# Patient Record
Sex: FEMALE | Race: WHITE | NOT HISPANIC OR LATINO | Employment: FULL TIME | URBAN - METROPOLITAN AREA
[De-identification: names, ages, dates, MRNs, and addresses within clinical notes are randomized per-mention and may not be internally consistent; named-entity substitution may affect disease eponyms.]

---

## 2017-01-16 ENCOUNTER — CONVERSION ENCOUNTER (OUTPATIENT)
Dept: MAMMOGRAPHY | Facility: HOSPITAL | Age: 57
End: 2017-01-16

## 2017-02-02 ENCOUNTER — CONVERSION ENCOUNTER (OUTPATIENT)
Dept: MAMMOGRAPHY | Facility: HOSPITAL | Age: 57
End: 2017-02-02

## 2017-05-12 ENCOUNTER — CONVERSION ENCOUNTER (OUTPATIENT)
Dept: GENERAL RADIOLOGY | Facility: HOSPITAL | Age: 57
End: 2017-05-12

## 2018-03-08 ENCOUNTER — OFFICE VISIT CONVERTED (OUTPATIENT)
Dept: CARDIOLOGY | Facility: CLINIC | Age: 58
End: 2018-03-08
Attending: INTERNAL MEDICINE

## 2018-05-03 ENCOUNTER — CONVERSION ENCOUNTER (OUTPATIENT)
Dept: GENERAL RADIOLOGY | Facility: HOSPITAL | Age: 58
End: 2018-05-03

## 2018-08-09 ENCOUNTER — OFFICE VISIT CONVERTED (OUTPATIENT)
Dept: OTHER | Facility: HOSPITAL | Age: 58
End: 2018-08-09
Attending: NURSE PRACTITIONER

## 2018-08-15 ENCOUNTER — OFFICE VISIT CONVERTED (OUTPATIENT)
Dept: CARDIOLOGY | Facility: CLINIC | Age: 58
End: 2018-08-15
Attending: INTERNAL MEDICINE

## 2018-08-21 ENCOUNTER — OFFICE VISIT CONVERTED (OUTPATIENT)
Dept: GASTROENTEROLOGY | Facility: CLINIC | Age: 58
End: 2018-08-21
Attending: PHYSICIAN ASSISTANT

## 2018-10-19 ENCOUNTER — OFFICE VISIT CONVERTED (OUTPATIENT)
Dept: GASTROENTEROLOGY | Facility: CLINIC | Age: 58
End: 2018-10-19
Attending: PHYSICIAN ASSISTANT

## 2018-11-09 ENCOUNTER — OFFICE VISIT CONVERTED (OUTPATIENT)
Dept: CARDIOLOGY | Facility: CLINIC | Age: 58
End: 2018-11-09
Attending: INTERNAL MEDICINE

## 2020-03-03 ENCOUNTER — HOSPITAL ENCOUNTER (OUTPATIENT)
Dept: SURGERY | Facility: CLINIC | Age: 60
Discharge: HOME | End: 2020-03-03
Attending: PHYSICIAN ASSISTANT

## 2020-03-03 ENCOUNTER — CONVERSION ENCOUNTER (OUTPATIENT)
Dept: SURGERY | Facility: CLINIC | Age: 60
End: 2020-03-03

## 2020-03-03 ENCOUNTER — OFFICE VISIT CONVERTED (OUTPATIENT)
Dept: SURGERY | Facility: CLINIC | Age: 60
End: 2020-03-03
Attending: PHYSICIAN ASSISTANT

## 2020-03-05 LAB
AMOXICILLIN+CLAV SUSC ISLT: 16
AMPICILLIN SUSC ISLT: >=32
AMPICILLIN+SULBAC SUSC ISLT: 16
BACTERIA UR CULT: ABNORMAL
CEFAZOLIN SUSC ISLT: <=4
CEFEPIME SUSC ISLT: <=1
CEFTAZIDIME SUSC ISLT: <=1
CEFTRIAXONE SUSC ISLT: <=1
CEFUROXIME ORAL SUSC ISLT: 16
CEFUROXIME PARENTER SUSC ISLT: 16
CIPROFLOXACIN SUSC ISLT: >=4
ERTAPENEM SUSC ISLT: <=0.5
GENTAMICIN SUSC ISLT: >=16
LEVOFLOXACIN SUSC ISLT: >=8
NITROFURANTOIN SUSC ISLT: <=16
TETRACYCLINE SUSC ISLT: >=16
TMP SMX SUSC ISLT: >=320
TOBRAMYCIN SUSC ISLT: 2

## 2020-04-01 ENCOUNTER — TELEMEDICINE CONVERTED (OUTPATIENT)
Dept: UROLOGY | Facility: CLINIC | Age: 60
End: 2020-04-01
Attending: UROLOGY

## 2020-07-15 ENCOUNTER — HOSPITAL ENCOUNTER (OUTPATIENT)
Dept: GENERAL RADIOLOGY | Facility: HOSPITAL | Age: 60
Discharge: HOME | End: 2020-07-15
Attending: UROLOGY

## 2020-07-15 LAB
CREAT BLD-MCNC: 0.8 MG/DL (ref 0.6–1.4)
GFR SERPLBLD BASED ON 1.73 SQ M-ARVRAT: >60 ML/MIN/{1.73_M2}

## 2020-07-22 ENCOUNTER — OFFICE VISIT CONVERTED (OUTPATIENT)
Dept: UROLOGY | Facility: CLINIC | Age: 60
End: 2020-07-22
Attending: UROLOGY

## 2020-07-22 ENCOUNTER — HOSPITAL ENCOUNTER (OUTPATIENT)
Dept: SURGERY | Facility: CLINIC | Age: 60
Discharge: HOME | End: 2020-07-22
Attending: UROLOGY

## 2020-07-24 LAB
AMOXICILLIN+CLAV SUSC ISLT: <=2
AMPICILLIN SUSC ISLT: <=2
AMPICILLIN+SULBAC SUSC ISLT: <=2
BACTERIA UR CULT: ABNORMAL
CEFAZOLIN SUSC ISLT: <=4
CEFEPIME SUSC ISLT: <=1
CEFTAZIDIME SUSC ISLT: <=1
CEFTRIAXONE SUSC ISLT: <=1
CEFUROXIME ORAL SUSC ISLT: <=1
CEFUROXIME PARENTER SUSC ISLT: <=1
CIPROFLOXACIN SUSC ISLT: <=0.25
ERTAPENEM SUSC ISLT: <=0.5
GENTAMICIN SUSC ISLT: <=1
LEVOFLOXACIN SUSC ISLT: <=0.12
NITROFURANTOIN SUSC ISLT: <=16
TETRACYCLINE SUSC ISLT: <=1
TMP SMX SUSC ISLT: <=20
TOBRAMYCIN SUSC ISLT: <=1

## 2020-11-25 ENCOUNTER — OFFICE VISIT CONVERTED (OUTPATIENT)
Dept: UROLOGY | Facility: CLINIC | Age: 60
End: 2020-11-25
Attending: UROLOGY

## 2020-11-25 ENCOUNTER — CONVERSION ENCOUNTER (OUTPATIENT)
Dept: SURGERY | Facility: CLINIC | Age: 60
End: 2020-11-25

## 2020-11-25 ENCOUNTER — HOSPITAL ENCOUNTER (OUTPATIENT)
Dept: SURGERY | Facility: CLINIC | Age: 60
Discharge: HOME | End: 2020-11-25
Attending: UROLOGY

## 2020-11-27 LAB — BACTERIA UR CULT: NORMAL

## 2020-12-23 ENCOUNTER — HOSPITAL ENCOUNTER (OUTPATIENT)
Dept: SURGERY | Facility: CLINIC | Age: 60
Discharge: HOME | End: 2020-12-23
Attending: UROLOGY

## 2020-12-25 LAB — BACTERIA UR CULT: NORMAL

## 2020-12-30 ENCOUNTER — PROCEDURE VISIT CONVERTED (OUTPATIENT)
Dept: UROLOGY | Facility: CLINIC | Age: 60
End: 2020-12-30
Attending: UROLOGY

## 2021-05-10 NOTE — PROCEDURES
Procedure Note      Patient Name: Yani Rowe   Patient ID: [id number]   Sex: Female   YOB: 1960    Primary Care Provider: Deejay Kaufman DO   Referring Provider: ADA JOHNSON MD    Visit Date: December 30, 2020    Provider: Annette Mackenzie MD   Location: [facility]   Location Address: [address] [place] [address]  [id number]   Location Phone: [phone]          Cystoscopy Procedure:  PROCEDURE: Flexible cystoscope was passed per urethra into the bladder without difficulty after proper consent. The bladder was inspected in a systematic meridian fashion. There were no tumors, lesions, stones, or other abnormalities noted within the bladder. Of note, there was no increased vascularity as well. Both ureteral orifices were identified and were normal in appearance. The flexible cystoscope was removed. The patient tolerated the procedure well.           Assessment  · Cystitis     595.9/N30.90  · Hematuria     599.70/R31.9      Plan  · Orders  o Cystoscopy (19501) - 595.9/N30.90, 599.70/R31.9 - 12/30/2020  · Instructions  o TIME OUT PROCEDURE: Correct patient and birth date; Correct procedure; Correct Physician; Consent signed  o She needs a referral to urogynecology or [facility] in [place] for possible repair of her rectocele which is likely the cause of her recurrent infections. She also has severe issues with bowel movements.             Electronically Signed by: Annette Mackenzie MD -Author on December 30, 2020 05:34:51 PM

## 2021-05-12 NOTE — PROGRESS NOTES
Quick Note      Patient Name: Yani Rowe   Patient ID: [id number]   Sex: Female   YOB: 1960    Primary Care Provider: Deejay Kaufman DO   Referring Provider: Deejay Kaufman DO    Visit Date: April 1, 2020    Provider: Annette Mackenzie MD   Location: [facility]   Location Address: [address] [place] [address]  [id number]   Location Phone: [phone]          History Of Present Illness  The patient is a 59 year old /White female, who is a consultation from Deejay Kaufman DO, for a persistent UTI.   Symptoms are similar to previous UTIs and burning when voiding; frequency and urgency; no symptoms now but here to establish care. This patient describes many UTIs over a 10 years period. She received treatment for these infections. The treating physician was Deejay Kaufman DO. She was treated with various antibiotics. Urine cultures revealed E. coli. She is no longer bothered by the symptoms. There are no additional symptoms. There are no aggravating factors. The patient is not constipated.   She has had prior treatment. Good relief has been obtained with antibiotics. The most recent antibiotic was taken 3 weeks ago. Prior evaluation has not been done.   She has no significant prior  problems.   TELEHEALTH VISIT  Chief Complaint: Persistent UTI   Yani Rowe is a 59 year old /White female who is presenting for evaluation via telehealth visit. Verbal consent obtained before beginning visit.   Provider spent 10 minutes with the patient during telehealth visit.   The following staff were present during this visit: Beverly Aguilar and Annette Mackenzie MD   Past Medical History/Overview of Patient Symptoms     She is not sure her infection is any better at this point.  In reviewing her recent records it appears she had a CT scan done about 7 or 8 years ago which did not show any stones but she is not had any imaging since then.  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      PMH  ***********************************************  Patient denies history of kidney stones    Patient hygiene  showers:  daily   wipe:  front to back - on occasion pats instead of wipes  all different types of underwear - tries to stick to cotton underwear  BM's:3 times  urinary incontinence:  yes  with urge or when she thinks she is done and gets up and dribbles on her self  most of time in the morning when she gets out of bed she will leak  OR stands up from sitting she will leak    Nocturia:  1-2  Daytime frequency:  4 times a day    Patient complains of yeast infection.   No antibiotics times 1 month.  Does have DM.  BS around 120-130      WE do not have records today of cultures done by PCP           Assessment  · Cystitis     595.9/N30.90  · Urinary tract infection     599.0/N39.0  · Candidiasis of genitalia in female     112.1/B37.3  · Hematuria     599.70/R31.9      Plan  · Orders  o Physican Telephone evaluation, 5-10 min (04318) - 599.0/N39.0, 112.1/B37.3, 595.9/N30.90 - 04/01/2020  o CT Abdomen and Pelvis without and with Contrast UROLOGY PROTOCOL (includes delayed imaging) [facility] ([id number]) - 595.9/N30.90, 599.70/R31.9 - 07/15/2020  · Medications  o Medications have been Reconciled  o Transition of Care or Provider Policy  · Instructions  o The patient has persistent UTI symptoms which are not responsive to the current treatment. She will have a CT scan done to try to evaluate for causes of her persistent UTI. We will schedule that in July when she is able to have it done after hopefully the Covid crisis has passed. I will see her for follow-up after that scan.  o Plan Of Care:   o Chronic conditions reviewed and taken into consideration for today's treatment plan.  o Patient instructed to seek medical attention urgently for new or worsening symptoms.  o Patient was educated/instructed on their  diagnosis, treatment and medications prior to discharge from the clinic today.            Electronically Signed by: Annette Mackenzie MD -Author on April 1, 2020 12:03:10 PM

## 2021-05-13 NOTE — PROGRESS NOTES
Progress Note      Patient Name: Yani Rowe   Patient ID: [id number]   Sex: Female   YOB: 1960    Primary Care Provider: Deejay Kaufman DO   Referring Provider: ADA JOHNSON MD    Visit Date: November 25, 2020    Provider: Annette Mackenzie MD   Location: [facility]   Location Address: [address] [place] [address]  [id number]   Location Phone: [phone]          Chief Complaint  · pt is here for urological concerns      History Of Present Illness  The patient is a 59 year old /White female , who is a follow up from Deejay Kaufman DO , for a persistent UTI.   Symptoms are similar to previous UTIs and burning when voiding; frequency and urgency; no symptoms now but here to establish care. This patient describes many UTIs over a 10 years period. She received treatment for these infections. The treating physician was Deejay Kaufman DO. She was treated with various antibiotics. Urine cultures revealed E. coli. She is no longer bothered by the symptoms. There are no additional symptoms. There are no aggravating factors. The patient is constipated.   She has had prior treatment. Good relief has been obtained with antibiotics. The most recent antibiotic was taken 3 weeks ago. Prior evaluation has not been done.   She has no significant prior  problems.      She is not sure her infection is any better at this point.  In reviewing her recent records it appears she had a CT scan done about 7 or 8 years ago which did not show any stones but she is not had any imaging since then.  She is appears her most of her infections are multidrug-resistant E. coli and she is had several a year for the past several years.    She recently had a repeat CT scan in July 2020 and it again is negative other than a rectocele.  She has no stones.  She states today that she has had issues with chronic constipation for years and sometimes has to splint or move around in order to have a  bowel movement.  She also at times has diarrhea.     Her urine appears infected today.  She states she has had one bad infection since I saw her last.          PMH  ***********************************************  Patient denies history of kidney stones    Patient hygiene  showers:  daily   wipe:  front to back - on occasion pats instead of wipes  all different types of underwear - tries to stick to cotton underwear  BM's:3 times  urinary incontinence:  yes  with urge or when she thinks she is done and gets up and dribbles on her self  most of time in the morning when she gets out of bed she will leak  OR stands up from sitting she will leak    Nocturia:  1-2  Daytime frequency:  4 times a day    Patient complains of yeast infection.   No antibiotics times 1 month.  Does have DM.  BS around 120-130       Past Medical History  Cystitis; Diverticulosis; Headache; Heart Disease; Hernia; High cholesterol; HTN (hypertension); SRINI (obstructive sleep apnea); Spells         Past Surgical History  Bladder Surg.; Cholecstectomy; Colonoscopy; EGD; Tubal ligation; Ventriculomyotomy for idiopathic hypertrophic subaortic stenosis         Medication List  Allegra Allergy 180 mg oral tablet; amlodipine 5 mg oral tablet; Enteric Coated Aspirin 81 mg oral tablet,delayed release (DR/EC); fluticasone propionate 50 mcg/actuation nasal spray,suspension; magnesium oral; metformin 500 mg oral tablet; metoprolol succinate 100 mg oral tablet extended release 24 hr; Multivitamin 50 Plus oral tablet; Vitamin B-12 1,000 mcg oral tablet         Allergy List  naproxen         Family Medical History  Liver Neoplasm, Malignant; Stomach Neoplasm, Malignant; Cervical Neoplasm, Malignant; Lung cancer         Social History  Alcohol (Current some day); ; Recreational Drug Use (Never); Tobacco (Never)         Review of Systems  · Constitutional  o Denies  o : chills, fever  · Gastrointestinal  o Denies  o : nausea, vomiting      Vitals  Date Time  "BP Position Site L\R Cuff Size HR RR TEMP (F) WT  HT  BMI kg/m2 BSA m2 O2 Sat FR L/min FiO2 HC       11/25/2020 04:07 /72 Sitting       269lbs 0oz 5'  4\" 46.17 2.35             Physical Examination  · Constitutional  o Appearance  o : well-nourished, well developed, alert, in no acute distress  · Respiratory  o Respiratory Effort  o : breathing unlabored  · Gastrointestinal  o Abdominal Examination  o : abdomen nontender to palpation, tone normal without rigidity or guarding, no masses present, abdominal obesity present           Results  · In-Office Procedures  o Lab procedure  § Automated dipstick urinalysis with microscopy ([id number])   § Color Ur: Yellow   § Clarity Ur: Clear   § Glucose Ur Ql Strip: Negative   § Bilirub Ur Ql Strip: Negative   § Ketones Ur Ql Strip: Negative   § Sp Gr Ur Qn: >=1.030   § Hgb Ur Ql Strip: Trace-intact   § pH Ur-LsCnc: 6.0   § Prot Ur Ql Strip: 30   § Urobilinogen Ur Strip-mCnc: 0.2   § Nitrite Ur Ql Strip: Negative   § WBC Est Ur Ql Strip: Small   § RBC UrnS Qn HPF: 5   § WBC UrnS Qn HPF: 20-50   § Bacteria UrnS Qn HPF: 2+   § Crystals UrnS Qn HPF: 0   § Epithelial Cells (non renal): 0 /HPF  § Epithelial Cells (renal): 0       Assessment  · Cystitis     595.9/N30.90  · Urinary tract infection     599.0/N39.0  · Candidiasis of genitalia in female     112.1/B37.3  · Hematuria     599.70/R31.9      Plan  · Orders  o Urine culture ([id number], [id number]) - 599.0/N39.0, 112.1/B37.3 - 11/25/2020  · Medications  o Medications have been Reconciled  o Transition of Care or Provider Policy  · Instructions  o The patient has persistent UTI symptoms which are not responsive to the current treatment. She has a rectocele and chronic competent constipation and diarrhea which likely could explain her persistent infection. She has seen Dr. Cisneros in the past and will see him again. She will arrange that appointment. I am sending her urine for culture today. I will set her up for an office cystosocopy. "   o Electronically Identified Patient Education Materials Provided Electronically            Electronically Signed by: Annette Mackenzie MD -Author on November 25, 2020 04:58:14 PM

## 2021-05-13 NOTE — PROGRESS NOTES
Progress Note      Patient Name: Yani Rowe   Patient ID: [id number]   Sex: Female   YOB: 1960    Primary Care Provider: Deejay Kaufman DO   Referring Provider: Deejay Kaufman DO    Visit Date: July 22, 2020    Provider: Annette Mackenzie MD   Location: [facility]   Location Address: [address] [place] [address]  [id number]   Location Phone: [phone]          Chief Complaint  · hematuria / cystitis      History Of Present Illness  The patient is a 59 year old /White female , who is a follow up from Deejay Kaufman DO , for a persistent UTI.   Symptoms are similar to previous UTIs and burning when voiding; frequency and urgency; no symptoms now but here to establish care. This patient describes many UTIs over a 10 years period. She received treatment for these infections. The treating physician was Deejay Kaufman DO. She was treated with various antibiotics. Urine cultures revealed E. coli. She is no longer bothered by the symptoms. There are no additional symptoms. There are no aggravating factors. The patient is constipated.   She has had prior treatment. Good relief has been obtained with antibiotics. The most recent antibiotic was taken 3 weeks ago. Prior evaluation has not been done.   She has no significant prior  problems.      She is not sure her infection is any better at this point.  In reviewing her recent records it appears she had a CT scan done about 7 or 8 years ago which did not show any stones but she is not had any imaging since then.  She is appears her most of her infections are multidrug-resistant E. coli and she is had several a year for the past several years.    She recently had a repeat CT scan and it again is negative other than a rectocele.  She has no stones.  She states today that she has had issues with chronic constipation for years and sometimes has to splint or move around in order to have a bowel movement.  She also at times has  diarrhea.  Her urine appears infected today.      PMH  ***********************************************  Patient denies history of kidney stones    Patient hygiene  showers:  daily   wipe:  front to back - on occasion pats instead of wipes  all different types of underwear - tries to stick to cotton underwear  BM's:3 times  urinary incontinence:  yes  with urge or when she thinks she is done and gets up and dribbles on her self  most of time in the morning when she gets out of bed she will leak  OR stands up from sitting she will leak    Nocturia:  1-2  Daytime frequency:  4 times a day    Patient complains of yeast infection.   No antibiotics times 1 month.  Does have DM.  BS around 120-130       Past Medical History  Cystitis; Diverticulosis; Headache; Heart Disease; Hernia; High cholesterol; HTN (hypertension); SRINI (obstructive sleep apnea); Spells         Past Surgical History  Bladder Surg.; Cholecstectomy; Colonoscopy; EGD; Tubal ligation; Ventriculomyotomy for idiopathic hypertrophic subaortic stenosis         Medication List  Allegra Allergy 180 mg oral tablet; amlodipine 5 mg oral tablet; Enteric Coated Aspirin 81 mg oral tablet,delayed release (DR/EC); fluticasone propionate 50 mcg/actuation nasal spray,suspension; magnesium oral; metformin 500 mg oral tablet; metoprolol succinate 100 mg oral tablet extended release 24 hr; Multivitamin 50 Plus oral tablet; Vitamin B-12 1,000 mcg oral tablet         Allergy List  naproxen       Allergies Reconciled  Family Medical History  Liver Neoplasm, Malignant; Stomach Neoplasm, Malignant; Cervical Neoplasm, Malignant; Lung cancer         Social History  Alcohol (Current some day); ; Recreational Drug Use (Never); Tobacco (Never)         Review of Systems  · Constitutional  o Denies  o : chills, fever  · Gastrointestinal  o Denies  o : nausea, vomiting      Vitals  Date Time BP Position Site L\R Cuff Size HR RR TEMP (F) WT  HT  BMI kg/m2 BSA m2 O2 Sat HC      "  07/22/2020 02:18 /77 Sitting       261lbs 0oz 5'  4\" 44.8 2.31           Physical Examination  · Constitutional  o Appearance  o : well-nourished, well developed, alert, in no acute distress  · Respiratory  o Respiratory Effort  o : breathing unlabored  · Gastrointestinal  o Abdominal Examination  o : abdomen nontender to palpation, tone normal without rigidity or guarding, no masses present, abdominal obesity present           Results  · In-Office Procedures  o Lab procedure  § Automated dipstick urinalysis with microscopy ([id number])   § Color Ur: Yellow   § Clarity Ur: Clear   § Glucose Ur Ql Strip: Negative   § Bilirub Ur Ql Strip: Negative   § Ketones Ur Ql Strip: Negative   § Sp Gr Ur Qn: 1.025   § Hgb Ur Ql Strip: Negative   § pH Ur-LsCnc: 6.0   § Prot Ur Ql Strip: Trace   § Urobilinogen Ur Strip-mCnc: 0.2   § Nitrite Ur Ql Strip: Negative   § WBC Est Ur Ql Strip: Small   § RBC UrnS Qn HPF: 5-10   § WBC UrnS Qn HPF: 5-10   § Bacteria UrnS Qn HPF: 2+   § Crystals UrnS Qn HPF: 0   § Epithelial Cells (non renal): 0 /HPF  § Epithelial Cells (renal): 0       Assessment  · Cystitis     595.9/N30.90  · Urinary tract infection     599.0/N39.0  · Candidiasis of genitalia in female     112.1/B37.3  · Hematuria     599.70/R31.9      Plan  · Orders  o Urine culture ([id number], [id number]) - 599.0/N39.0, 112.1/B37.3 - 07/22/2020  · Medications  o Medications have been Reconciled  o Transition of Care or Provider Policy  · Instructions  o The patient has persistent UTI symptoms which are not responsive to the current treatment. She has a rectocele and chronic competent constipation and diarrhea which likely could explain her persistent infection. She has seen Dr. Kendrick in the past and so we will get her an appointment to see his office further the constipation issue. I am sending her urine for culture today.   o KUB  o Electronically Identified Patient Education Materials Provided Electronically            Electronically " Signed by: Annette Mackenzie MD -Author on July 22, 2020 03:28:56 PM

## 2021-05-14 VITALS
OXYGEN SATURATION: 98 % | WEIGHT: 264 LBS | BODY MASS INDEX: 45.07 KG/M2 | HEART RATE: 72 BPM | SYSTOLIC BLOOD PRESSURE: 177 MMHG | HEIGHT: 64 IN | DIASTOLIC BLOOD PRESSURE: 108 MMHG

## 2021-05-14 VITALS
BODY MASS INDEX: 45.93 KG/M2 | WEIGHT: 269 LBS | DIASTOLIC BLOOD PRESSURE: 72 MMHG | SYSTOLIC BLOOD PRESSURE: 146 MMHG | HEIGHT: 64 IN

## 2021-05-15 VITALS
BODY MASS INDEX: 44.56 KG/M2 | HEIGHT: 64 IN | DIASTOLIC BLOOD PRESSURE: 77 MMHG | WEIGHT: 261 LBS | SYSTOLIC BLOOD PRESSURE: 152 MMHG

## 2021-05-15 VITALS — RESPIRATION RATE: 14 BRPM | HEIGHT: 64 IN | BODY MASS INDEX: 43.96 KG/M2 | WEIGHT: 257.5 LBS

## 2021-05-16 VITALS
HEIGHT: 64 IN | HEART RATE: 58 BPM | DIASTOLIC BLOOD PRESSURE: 80 MMHG | WEIGHT: 254 LBS | SYSTOLIC BLOOD PRESSURE: 126 MMHG | BODY MASS INDEX: 43.36 KG/M2

## 2021-05-16 VITALS
WEIGHT: 244 LBS | BODY MASS INDEX: 41.66 KG/M2 | HEART RATE: 64 BPM | DIASTOLIC BLOOD PRESSURE: 80 MMHG | SYSTOLIC BLOOD PRESSURE: 122 MMHG | HEIGHT: 64 IN

## 2021-05-16 VITALS
HEIGHT: 64 IN | BODY MASS INDEX: 42.17 KG/M2 | SYSTOLIC BLOOD PRESSURE: 108 MMHG | WEIGHT: 247 LBS | HEART RATE: 66 BPM | DIASTOLIC BLOOD PRESSURE: 72 MMHG

## 2021-05-16 VITALS
SYSTOLIC BLOOD PRESSURE: 130 MMHG | RESPIRATION RATE: 12 BRPM | OXYGEN SATURATION: 99 % | BODY MASS INDEX: 42.58 KG/M2 | HEIGHT: 64 IN | WEIGHT: 249.44 LBS | DIASTOLIC BLOOD PRESSURE: 76 MMHG | HEART RATE: 62 BPM

## 2021-05-16 VITALS
RESPIRATION RATE: 18 BRPM | HEIGHT: 64 IN | HEART RATE: 83 BPM | TEMPERATURE: 97.8 F | DIASTOLIC BLOOD PRESSURE: 60 MMHG | OXYGEN SATURATION: 95 % | SYSTOLIC BLOOD PRESSURE: 110 MMHG | BODY MASS INDEX: 42 KG/M2 | WEIGHT: 246 LBS

## 2021-05-16 VITALS
DIASTOLIC BLOOD PRESSURE: 75 MMHG | HEART RATE: 55 BPM | HEIGHT: 64 IN | BODY MASS INDEX: 43.54 KG/M2 | WEIGHT: 255 LBS | OXYGEN SATURATION: 100 % | SYSTOLIC BLOOD PRESSURE: 139 MMHG

## 2021-08-04 ENCOUNTER — TRANSCRIBE ORDERS (OUTPATIENT)
Dept: ADMINISTRATIVE | Facility: HOSPITAL | Age: 61
End: 2021-08-04

## 2021-08-04 DIAGNOSIS — Z12.39 SCREENING BREAST EXAMINATION: Primary | ICD-10-CM

## 2021-08-06 ENCOUNTER — TRANSCRIBE ORDERS (OUTPATIENT)
Dept: ADMINISTRATIVE | Facility: HOSPITAL | Age: 61
End: 2021-08-06

## 2021-08-06 DIAGNOSIS — N95.0 POST-MENOPAUSAL BLEEDING: Primary | ICD-10-CM

## 2021-08-17 ENCOUNTER — OFFICE VISIT (OUTPATIENT)
Dept: GASTROENTEROLOGY | Facility: CLINIC | Age: 61
End: 2021-08-17

## 2021-08-17 ENCOUNTER — PREP FOR SURGERY (OUTPATIENT)
Dept: OTHER | Facility: HOSPITAL | Age: 61
End: 2021-08-17

## 2021-08-17 VITALS
HEIGHT: 65 IN | WEIGHT: 266.9 LBS | OXYGEN SATURATION: 98 % | HEART RATE: 66 BPM | SYSTOLIC BLOOD PRESSURE: 135 MMHG | BODY MASS INDEX: 44.47 KG/M2 | DIASTOLIC BLOOD PRESSURE: 74 MMHG

## 2021-08-17 DIAGNOSIS — R10.30 LOWER ABDOMINAL PAIN: Primary | ICD-10-CM

## 2021-08-17 DIAGNOSIS — K57.90 DIVERTICULOSIS: ICD-10-CM

## 2021-08-17 DIAGNOSIS — K21.9 GASTROESOPHAGEAL REFLUX DISEASE, UNSPECIFIED WHETHER ESOPHAGITIS PRESENT: ICD-10-CM

## 2021-08-17 DIAGNOSIS — Z12.11 SCREENING FOR COLON CANCER: ICD-10-CM

## 2021-08-17 DIAGNOSIS — N39.0 FREQUENT UTI: ICD-10-CM

## 2021-08-17 PROCEDURE — 99214 OFFICE O/P EST MOD 30 MIN: CPT | Performed by: NURSE PRACTITIONER

## 2021-08-17 RX ORDER — PANTOPRAZOLE SODIUM 40 MG/1
40 TABLET, DELAYED RELEASE ORAL DAILY
COMMUNITY
Start: 2021-06-15

## 2021-08-17 RX ORDER — FLUTICASONE PROPIONATE 50 MCG
1 SPRAY, SUSPENSION (ML) NASAL 2 TIMES DAILY
COMMUNITY

## 2021-08-17 RX ORDER — ESTRADIOL 0.1 MG/G
0.5 CREAM VAGINAL 3 TIMES WEEKLY
COMMUNITY
Start: 2021-03-17

## 2021-08-17 RX ORDER — PEG-3350, SODIUM SULFATE, SODIUM CHLORIDE, POTASSIUM CHLORIDE, SODIUM ASCORBATE AND ASCORBIC ACID 7.5-2.691G
KIT ORAL
Qty: 1 EACH | Refills: 0 | Status: SHIPPED | OUTPATIENT
Start: 2021-08-17

## 2021-08-17 RX ORDER — MULTIVIT WITH MINERALS/LUTEIN
250 TABLET ORAL DAILY
COMMUNITY

## 2021-08-17 RX ORDER — ERGOCALCIFEROL 1.25 MG/1
50000 CAPSULE ORAL WEEKLY
COMMUNITY
Start: 2021-05-12

## 2021-08-17 RX ORDER — MULTIPLE VITAMINS W/ MINERALS TAB 9MG-400MCG
1 TAB ORAL DAILY
COMMUNITY

## 2021-08-17 RX ORDER — METOPROLOL SUCCINATE 100 MG/1
100 TABLET, EXTENDED RELEASE ORAL 2 TIMES DAILY
COMMUNITY
Start: 2021-05-12

## 2021-08-17 RX ORDER — ASPIRIN 81 MG/1
2 TABLET ORAL DAILY
COMMUNITY

## 2021-08-17 RX ORDER — FEXOFENADINE HCL 180 MG/1
1 TABLET ORAL DAILY
COMMUNITY

## 2021-08-17 RX ORDER — AMLODIPINE BESYLATE 5 MG/1
5 TABLET ORAL DAILY
COMMUNITY
Start: 2021-07-19

## 2021-08-17 RX ORDER — CHLORAL HYDRATE 500 MG
1 CAPSULE ORAL DAILY
COMMUNITY

## 2021-08-17 RX ORDER — ROSUVASTATIN CALCIUM 10 MG/1
10 TABLET, COATED ORAL DAILY
COMMUNITY
Start: 2021-08-12

## 2021-08-17 NOTE — PROGRESS NOTES
Chief Complaint  Abdominal Pain    History of Present Illness  Yani Rowe is a 61 y.o. female who presents to [facility] for follow-up     61-year-old female presented to the office today for a follow-up with a history of diverticulosis, Schatzki's ring, hemorrhoids, dysphagia, reflux and frequent UTIs. Patient reports that she was seen by Dr. Mackenzie her urologist who recommended a colonoscopy related to her frequent urinary tract infections. Patient reports bowel movements that are occurring 2-3 times a day that are loose to normal. Patient reports bright red blood in her stool about 2 months ago that is now resolved. She is having some lower abdominal pain that is cramping-like in sensation. She does not take any medications for this. Patient reports she is trying to hydrate she is eating a well-balanced diet but does not take in enough fiber. Patient reports her dysphagia has been well controlled since her previous EGD with dilation. She does take Protonix 40 mg daily for her reflux which is well controlled.     EGD: Review of the patient's most recent EGD performed by Dr. Cisneros on 9/7/2018 revealed a small hiatal hernia, Schatzki's ring normal mucosa throughout the stomach.  Dilation with 18 mm balloon.    Colonoscopy: Reported in 2016 with diverticulosis and hemorrhoids.    Past Medical History:   Diagnosis Date   • Condition not found     hernia controlled with medication.   • Condition not found     Spells. Patient has mult. complaints. Reports spells in the afternoon characterized as falling asleep. Currently undergoing at home sleepstudy. Pending results. Also reports spells that are characterized as having diff. comprehending conversations. I will pursue MRI of brain. Reports intermittent bouts of grossly elevated BP. Will pursue serum metanephrines to evaluate the unlikely poss pheochrom    • Cystitis    • Diverticulosis    • Headache 09/07/2017    The patient reports  a 2 to three year history of headaches.  She reports that her headaches worsened in August 2017. Since that time she has had intermittent bouts of elevated blood pressure.  She does report having a prior MRI of the brain few years ago.  However, her headaches have changed insevertiy in quality. ThusI will pursue an MRI of the brain.   • Heart disease    • High cholesterol    • HTN (hypertension) 11/14/2017    well controlled   • SRINI (obstructive sleep apnea) 11/14/2017    on CPAP       Past Surgical History:   Procedure Laterality Date   • BLADDER SURGERY     • CHOLECYSTECTOMY     • COLONOSCOPY  2011, 2016   • ENDOSCOPY  2011 2014 2018   • TUBAL ABDOMINAL LIGATION     • VENTRICULOSTOMY  08/25/2019    for idiopathic hypertrophic subaortic stenosis         Current Outpatient Medications:   •  amLODIPine (NORVASC) 5 MG tablet, Take 5 mg by mouth Daily., Disp: , Rfl:   •  aspirin (aspirin) 81 MG EC tablet, Take 2 tablets by mouth Daily., Disp: , Rfl:   •  estradiol (ESTRACE) 0.1 MG/GM vaginal cream, Insert 0.5 g into the vagina 3 (Three) Times a Week., Disp: , Rfl:   •  fexofenadine (Allegra Allergy) 180 MG tablet, Take 1 tablet by mouth Daily., Disp: , Rfl:   •  fluticasone (FLONASE) 50 MCG/ACT nasal spray, 1 spray into the nostril(s) as directed by provider 2 (two) times a day., Disp: , Rfl:   •  metFORMIN (GLUCOPHAGE) 500 MG tablet, Take 1 tablet by mouth Daily., Disp: , Rfl:   •  metoprolol succinate XL (TOPROL-XL) 100 MG 24 hr tablet, Take 100 mg by mouth 2 (Two) Times a Day., Disp: , Rfl:   •  multivitamin with minerals tablet tablet, Take 1 tablet by mouth Daily., Disp: , Rfl:   •  Omega-3 Fatty Acids (fish oil) 1000 MG capsule capsule, Take 1 capsule by mouth Daily., Disp: , Rfl:   •  pantoprazole (PROTONIX) 40 MG EC tablet, Take 40 mg by mouth Daily., Disp: , Rfl:   •  rosuvastatin (CRESTOR) 10 MG tablet, Take 10 mg by mouth Daily., Disp: , Rfl:   •  vitamin C (ASCORBIC ACID) 250 MG tablet, Take 250 mg by mouth  "Daily., Disp: , Rfl:   •  vitamin D (ERGOCALCIFEROL) 1.25 MG (12845 UT) capsule capsule, Take 50,000 Units by mouth 1 (One) Time Per Week., Disp: , Rfl:   •  PEG-KCl-NaCl-NaSulf-Na Asc-C (MOVIPREP) 100 g reconstituted solution powder, Instructions provided by office. Colon prep., Disp: 1 each, Rfl: 0     Allergies   Allergen Reactions   • Naproxen Hives       Family History   Problem Relation Age of Onset   • Cervical cancer Mother         mother living   • Liver cancer Father          d/t CA   • Lung cancer Other    • Stomach cancer Daughter         Social History     Social History Narrative   • Not on file       Objective         Vital Signs:   /74   Pulse 66   Ht 165.1 cm (65\")   Wt 121 kg (266 lb 14.4 oz)   SpO2 98%   BMI 44.41 kg/m²       Physical Exam  Constitutional:       General: She is not in acute distress.     Appearance: Normal appearance.   HENT:      Head: Normocephalic.   Eyes:      Conjunctiva/sclera: Conjunctivae normal.      Pupils: Pupils are equal, round, and reactive to light.      Visual Fields: Right eye visual fields normal and left eye visual fields normal.   Neck:      Trachea: Trachea normal.   Cardiovascular:      Rate and Rhythm: Normal rate and regular rhythm.      Heart sounds: Normal heart sounds.   Pulmonary:      Effort: Pulmonary effort is normal.      Breath sounds: Normal breath sounds and air entry.      Comments: Inspection of chest: normal appearance  Abdominal:      General: Abdomen is flat. Bowel sounds are normal.      Palpations: Abdomen is soft. There is no mass.      Tenderness: There is no guarding.   Musculoskeletal:      Right lower leg: No edema.      Left lower leg: No edema.   Skin:     Findings: No lesion.      Comments: Turgor is normal   Neurological:      Mental Status: She is alert and oriented to person, place, and time.   Psychiatric:         Mood and Affect: Mood and affect normal.         Result Review :                  Assessment and " Plan    Diagnoses and all orders for this visit:    1. Lower abdominal pain (Primary)    2. Diverticulosis    3. Frequent UTI    4. Gastroesophageal reflux disease, unspecified whether esophagitis present        61-year-old female presented to the office today for a follow-up with a history of diverticulosis, Schatzki's ring, hemorrhoids, dysphagia, reflux and frequent UTIs. Patient reports that she was seen by Dr. Mackenzie her urologist who recommended a colonoscopy related to her frequent urinary tract infections I have recommended that the patient undergo further evaluation with a colonoscopy.  I have discussed this procedure in detail with the patient.  I have discussed the risks, benefits and alternatives.  I have discussed the risk of anesthesia, bleeding and perforation.  Patient understands these risks, benefits and alternatives and wishes to proceed.  I will schedule her at her earliest convenience. Patient will continue Protonix 40 mg daily. Patient wishes not to do any medication for abdominal pain but could consider Bentyl if she is open to this.              Follow Up   Return for Follow up after endoscopy in office.  Patient was given instructions and counseling regarding her condition or for health maintenance advice. Please see specific information pulled into the AVS if appropriate.

## 2021-08-21 ENCOUNTER — APPOINTMENT (OUTPATIENT)
Dept: ULTRASOUND IMAGING | Facility: HOSPITAL | Age: 61
End: 2021-08-21

## 2021-09-16 ENCOUNTER — TRANSCRIBE ORDERS (OUTPATIENT)
Dept: OBSTETRICS AND GYNECOLOGY | Facility: CLINIC | Age: 61
End: 2021-09-16

## 2021-09-16 DIAGNOSIS — N95.0 POST-MENOPAUSAL BLEEDING: Primary | ICD-10-CM

## 2021-11-04 RX ORDER — UBIDECARENONE 100 MG
100 CAPSULE ORAL DAILY
COMMUNITY

## 2021-11-04 NOTE — PRE-PROCEDURE INSTRUCTIONS
Pt. Instructed on  Laxative and skin prep , pre-op meds, clear liquid diet. Ok to take all meds except Aspirin, all vitamins, supplements, fish oil a.m. of procedure.

## 2021-11-05 ENCOUNTER — HOSPITAL ENCOUNTER (OUTPATIENT)
Facility: HOSPITAL | Age: 61
Setting detail: HOSPITAL OUTPATIENT SURGERY
Discharge: HOME | End: 2021-11-05
Attending: INTERNAL MEDICINE | Admitting: INTERNAL MEDICINE

## 2021-11-05 ENCOUNTER — ANESTHESIA (OUTPATIENT)
Dept: GASTROENTEROLOGY | Facility: HOSPITAL | Age: 61
End: 2021-11-05

## 2021-11-05 ENCOUNTER — ANESTHESIA EVENT (OUTPATIENT)
Dept: GASTROENTEROLOGY | Facility: HOSPITAL | Age: 61
End: 2021-11-05

## 2021-11-05 VITALS
SYSTOLIC BLOOD PRESSURE: 105 MMHG | DIASTOLIC BLOOD PRESSURE: 63 MMHG | BODY MASS INDEX: 43.71 KG/M2 | HEIGHT: 65 IN | OXYGEN SATURATION: 93 % | RESPIRATION RATE: 13 BRPM | WEIGHT: 262.35 LBS | HEART RATE: 57 BPM | TEMPERATURE: 97.3 F

## 2021-11-05 DIAGNOSIS — Z12.11 SCREENING FOR COLON CANCER: ICD-10-CM

## 2021-11-05 DIAGNOSIS — K21.9 GASTROESOPHAGEAL REFLUX DISEASE, UNSPECIFIED WHETHER ESOPHAGITIS PRESENT: ICD-10-CM

## 2021-11-05 DIAGNOSIS — R10.30 LOWER ABDOMINAL PAIN: ICD-10-CM

## 2021-11-05 DIAGNOSIS — N39.0 FREQUENT UTI: ICD-10-CM

## 2021-11-05 DIAGNOSIS — K57.90 DIVERTICULOSIS: ICD-10-CM

## 2021-11-05 PROCEDURE — 45380 COLONOSCOPY AND BIOPSY: CPT | Performed by: INTERNAL MEDICINE

## 2021-11-05 PROCEDURE — 45385 COLONOSCOPY W/LESION REMOVAL: CPT | Performed by: INTERNAL MEDICINE

## 2021-11-05 PROCEDURE — 88305 TISSUE EXAM BY PATHOLOGIST: CPT | Performed by: INTERNAL MEDICINE

## 2021-11-05 PROCEDURE — 25010000002 PROPOFOL 10 MG/ML EMULSION: Performed by: NURSE ANESTHETIST, CERTIFIED REGISTERED

## 2021-11-05 RX ORDER — LIDOCAINE HYDROCHLORIDE 20 MG/ML
INJECTION, SOLUTION INFILTRATION; PERINEURAL AS NEEDED
Status: DISCONTINUED | OUTPATIENT
Start: 2021-11-05 | End: 2021-11-05 | Stop reason: SURG

## 2021-11-05 RX ORDER — SODIUM CHLORIDE, SODIUM LACTATE, POTASSIUM CHLORIDE, CALCIUM CHLORIDE 600; 310; 30; 20 MG/100ML; MG/100ML; MG/100ML; MG/100ML
30 INJECTION, SOLUTION INTRAVENOUS CONTINUOUS
Status: DISCONTINUED | OUTPATIENT
Start: 2021-11-05 | End: 2021-11-05 | Stop reason: HOSPADM

## 2021-11-05 RX ORDER — DEXMEDETOMIDINE HYDROCHLORIDE 100 UG/ML
INJECTION, SOLUTION INTRAVENOUS AS NEEDED
Status: DISCONTINUED | OUTPATIENT
Start: 2021-11-05 | End: 2021-11-05 | Stop reason: SURG

## 2021-11-05 RX ORDER — KETAMINE HYDROCHLORIDE 50 MG/ML
INJECTION, SOLUTION, CONCENTRATE INTRAMUSCULAR; INTRAVENOUS AS NEEDED
Status: DISCONTINUED | OUTPATIENT
Start: 2021-11-05 | End: 2021-11-05 | Stop reason: SURG

## 2021-11-05 RX ORDER — SODIUM CHLORIDE, SODIUM LACTATE, POTASSIUM CHLORIDE, CALCIUM CHLORIDE 600; 310; 30; 20 MG/100ML; MG/100ML; MG/100ML; MG/100ML
INJECTION, SOLUTION INTRAVENOUS CONTINUOUS PRN
Status: DISCONTINUED | OUTPATIENT
Start: 2021-11-05 | End: 2021-11-05 | Stop reason: SURG

## 2021-11-05 RX ORDER — PROPOFOL 10 MG/ML
VIAL (ML) INTRAVENOUS AS NEEDED
Status: DISCONTINUED | OUTPATIENT
Start: 2021-11-05 | End: 2021-11-05 | Stop reason: SURG

## 2021-11-05 RX ADMIN — PROPOFOL 30 MG: 10 INJECTION, EMULSION INTRAVENOUS at 10:44

## 2021-11-05 RX ADMIN — PROPOFOL 40 MG: 10 INJECTION, EMULSION INTRAVENOUS at 10:42

## 2021-11-05 RX ADMIN — LIDOCAINE HYDROCHLORIDE 100 MG: 20 INJECTION, SOLUTION INFILTRATION; PERINEURAL at 10:37

## 2021-11-05 RX ADMIN — PROPOFOL 40 MG: 10 INJECTION, EMULSION INTRAVENOUS at 10:49

## 2021-11-05 RX ADMIN — DEXMEDETOMIDINE HYDROCHLORIDE 12 MCG: 100 INJECTION, SOLUTION, CONCENTRATE INTRAVENOUS at 10:37

## 2021-11-05 RX ADMIN — SODIUM CHLORIDE, POTASSIUM CHLORIDE, SODIUM LACTATE AND CALCIUM CHLORIDE: 600; 310; 30; 20 INJECTION, SOLUTION INTRAVENOUS at 10:32

## 2021-11-05 RX ADMIN — PROPOFOL 30 MG: 10 INJECTION, EMULSION INTRAVENOUS at 10:51

## 2021-11-05 RX ADMIN — KETAMINE HYDROCHLORIDE 10 MG: 50 INJECTION, SOLUTION INTRAMUSCULAR; INTRAVENOUS at 10:44

## 2021-11-05 RX ADMIN — KETAMINE HYDROCHLORIDE 15 MG: 50 INJECTION, SOLUTION INTRAMUSCULAR; INTRAVENOUS at 10:37

## 2021-11-05 RX ADMIN — DEXMEDETOMIDINE HYDROCHLORIDE 12 MCG: 100 INJECTION, SOLUTION, CONCENTRATE INTRAVENOUS at 10:44

## 2021-11-05 RX ADMIN — DEXMEDETOMIDINE HYDROCHLORIDE 8 MCG: 100 INJECTION, SOLUTION, CONCENTRATE INTRAVENOUS at 10:52

## 2021-11-05 RX ADMIN — PROPOFOL 20 MG: 10 INJECTION, EMULSION INTRAVENOUS at 10:37

## 2021-11-05 RX ADMIN — DEXMEDETOMIDINE HYDROCHLORIDE 8 MCG: 100 INJECTION, SOLUTION, CONCENTRATE INTRAVENOUS at 10:47

## 2021-11-05 NOTE — ANESTHESIA POSTPROCEDURE EVALUATION
Patient: Yani Rowe    Procedure Summary     Date: 11/05/21 Room / Location: [facility] ENDOSCOPY 2 / [facility] ENDOSCOPY    Anesthesia Start: 1032 Anesthesia Stop: 1103    Procedure: COLONOSCOPY (N/A ) Diagnosis:       Lower abdominal pain      Diverticulosis      Frequent UTI      Gastroesophageal reflux disease, unspecified whether esophagitis present      Screening for colon cancer      (Lower abdominal pain [R10.30])      (Diverticulosis [K57.90])      (Frequent UTI [N39.0])      (Gastroesophageal reflux disease, unspecified whether esophagitis present [K21.9])      (Screening for colon cancer [Z12.11])    Surgeons: Naeem Cisneros MD Provider: Roby Servin MD    Anesthesia Type: general ASA Status: 3          Anesthesia Type: general    Vitals  Vitals Value Taken Time   BP 94/63 11/05/21 1120   Temp 36.3 °C (97.3 °F) 11/05/21 1105   Pulse 59 11/05/21 1124   Resp 14 11/05/21 1120   SpO2 95 % 11/05/21 1124   Vitals shown include unvalidated device data.        Post Anesthesia Care and Evaluation    Patient location during evaluation: bedside  Patient participation: complete - patient participated  Level of consciousness: awake  Pain management: adequate  Airway patency: patent  Anesthetic complications: No anesthetic complications  PONV Status: none  Cardiovascular status: acceptable and stable  Respiratory status: acceptable  Hydration status: acceptable    Comments: An Anesthesiologist personally participated in the most demanding procedures (including induction and emergence if applicable) in the anesthesia plan, monitored the course of anesthesia administration at frequent intervals and remained physically present and available for immediate diagnosis and treatment of emergencies.

## 2021-11-05 NOTE — ANESTHESIA PREPROCEDURE EVALUATION
Anesthesia Evaluation     Patient summary reviewed and Nursing notes reviewed   no history of anesthetic complications:  NPO Solid Status: > 8 hours  NPO Liquid Status: > 2 hours           Airway   Mallampati: II  TM distance: >3 FB  Neck ROM: full  No difficulty expected  Dental          Pulmonary - normal exam    breath sounds clear to auscultation  (+) sleep apnea,   Cardiovascular - normal exam  Exercise tolerance: good (4-7 METS)    Rhythm: regular    (+) hypertension, hyperlipidemia,     ROS comment: Cardiac repair for excessive muscle tissue a few years ago and now doing well    Neuro/Psych  (+) headaches,     GI/Hepatic/Renal/Endo    (+)  GERD,  diabetes mellitus,     Musculoskeletal (-) negative ROS    Abdominal    Substance History - negative use     OB/GYN negative ob/gyn ROS         Other - negative ROS                       Anesthesia Plan    ASA 3     general   (Total IV Anesthesia    Patient understands anesthesia not responsible for dental damage.  )  intravenous induction     Anesthetic plan, all risks, benefits, and alternatives have been provided, discussed and informed consent has been obtained with: patient.

## 2021-11-05 NOTE — H&P
Pre Procedure History & Physical    Chief Complaint:   Abdominal pain  diverticulosis    Subjective     HPI:   As above    Past Medical History:   Past Medical History:   Diagnosis Date   • Condition not found     hernia controlled with medication.   • Condition not found     Spells. Patient has mult. complaints. Reports spells in the afternoon characterized as falling asleep. Currently undergoing at home sleepstudy. Pending results. Also reports spells that are characterized as having diff. comprehending conversations. I will pursue MRI of brain. Reports intermittent bouts of grossly elevated BP. Will pursue serum metanephrines to evaluate the unlikely poss pheochrom    • Cystitis    • Diabetes mellitus (HCC)    • Diverticulosis    • GERD (gastroesophageal reflux disease)    • Headache 2017    The patient reports a 2 to three year history of headaches.  She reports that her headaches worsened in 2017. Since that time she has had intermittent bouts of elevated blood pressure.  She does report having a prior MRI of the brain few years ago.  However, her headaches have changed insevertiy in quality. ThusI will pursue an MRI of the brain.   • Heart disease     left  blockage    • High cholesterol    • HTN (hypertension) 2017    well controlled   • SRINI (obstructive sleep apnea) 2017    on CPAP   • PONV (postoperative nausea and vomiting)        Past Surgical History:  Past Surgical History:   Procedure Laterality Date   • BLADDER SURGERY      repair   • CHOLECYSTECTOMY     • COLONOSCOPY  ,    • ENDOSCOPY  2011    with dilation   • TUBAL ABDOMINAL LIGATION     • VENTRICULOSTOMY  2019    for idiopathic hypertrophic subaortic stenosis       Family History:  Family History   Problem Relation Age of Onset   • Cervical cancer Mother         mother living   • Liver cancer Father          d/t CA   • Lung cancer Other    • Stomach cancer Daughter    • Malig Hyperthermia  Neg Hx        Social History:   reports that she has never smoked. She has never used smokeless tobacco. She reports current alcohol use. She reports that she does not use drugs.    Medications:   Medications Prior to Admission   Medication Sig Dispense Refill Last Dose   • amLODIPine (NORVASC) 5 MG tablet Take 5 mg by mouth Daily.   11/4/2021 at 0800   • aspirin (aspirin) 81 MG EC tablet Take 2 tablets by mouth Daily.   11/4/2021 at 0800   • coenzyme Q10 100 MG capsule Take 100 mg by mouth Daily.   11/4/2021 at 0800   • estradiol (ESTRACE) 0.1 MG/GM vaginal cream Insert 0.5 g into the vagina 3 (Three) Times a Week.   11/4/2021 at 0800   • fexofenadine (Allegra Allergy) 180 MG tablet Take 1 tablet by mouth Daily.   11/4/2021 at 0800   • metFORMIN (GLUCOPHAGE) 500 MG tablet Take 1 tablet by mouth 2 (Two) Times a Day With Meals.   11/4/2021 at 0800   • metoprolol succinate XL (TOPROL-XL) 100 MG 24 hr tablet Take 100 mg by mouth 2 (Two) Times a Day.   11/4/2021 at 0800   • multivitamin with minerals tablet tablet Take 1 tablet by mouth Daily.   11/4/2021 at 0800   • Omega-3 Fatty Acids (fish oil) 1000 MG capsule capsule Take 1 capsule by mouth Daily.   11/4/2021 at 0800   • pantoprazole (PROTONIX) 40 MG EC tablet Take 40 mg by mouth Daily.   11/4/2021 at 0800   • PEG-KCl-NaCl-NaSulf-Na Asc-C (MOVIPREP) 100 g reconstituted solution powder Instructions provided by office. Colon prep. 1 each 0 11/4/2021 at 0800   • rosuvastatin (CRESTOR) 10 MG tablet Take 10 mg by mouth Daily.   11/4/2021 at 0800   • TURMERIC PO Take  by mouth.   11/4/2021 at 0800   • vitamin C (ASCORBIC ACID) 250 MG tablet Take 250 mg by mouth Daily.   11/4/2021 at 0800   • vitamin D (ERGOCALCIFEROL) 1.25 MG (67865 UT) capsule capsule Take 50,000 Units by mouth 1 (One) Time Per Week.   11/4/2021 at 0800   • fluticasone (FLONASE) 50 MCG/ACT nasal spray 1 spray into the nostril(s) as directed by provider 2 (two) times a day.     "      Allergies:  Naproxen        Objective     Blood pressure 165/96, pulse 63, temperature 97 °F (36.1 °C), resp. rate 16, height 164 cm (64.57\"), weight 119 kg (262 lb 5.6 oz), SpO2 94 %.    Physical Exam   Constitutional: Pt is oriented to person, place, and time and well-developed, well-nourished, and in no distress.   Mouth/Throat: Oropharynx is clear and moist.   Neck: Normal range of motion.   Cardiovascular: Normal rate, regular rhythm and normal heart sounds.    Pulmonary/Chest: Effort normal and breath sounds normal.   Abdominal: Soft. Nontender  Skin: Skin is warm and dry.   Psychiatric: Mood, memory, affect and judgment normal.     Assessment/Plan     Diagnosis:  Abdominal pain  diverticulosis    Anticipated Surgical Procedure:  colonoscopy    The risks, benefits, and alternatives of this procedure have been discussed with the patient or the responsible party- the patient understands and agrees to proceed.            "

## 2021-11-05 NOTE — DISCHARGE INSTRUCTIONS
Diverticulosis    Diverticulosis is a condition that develops when small pouches (diverticula) form in the wall of the large intestine (colon). The colon is where water is absorbed and stool (feces) is formed. The pouches form when the inside layer of the colon pushes through weak spots in the outer layers of the colon. You may have a few pouches or many of them.  The pouches usually do not cause problems unless they become inflamed or infected. When this happens, the condition is called diverticulitis.  What are the causes?  The cause of this condition is not known.  What increases the risk?  The following factors may make you more likely to develop this condition:  · Being older than age 60. Your risk for this condition increases with age. Diverticulosis is rare among people younger than age 30. By age 80, many people have it.  · Eating a low-fiber diet.  · Having frequent constipation.  · Being overweight.  · Not getting enough exercise.  · Smoking.  · Taking over-the-counter pain medicines, like aspirin and ibuprofen.  · Having a family history of diverticulosis.  What are the signs or symptoms?  In most people, there are no symptoms of this condition. If you do have symptoms, they may include:  · Bloating.  · Cramps in the abdomen.  · Constipation or diarrhea.  · Pain in the lower left side of the abdomen.  How is this diagnosed?  Because diverticulosis usually has no symptoms, it is most often diagnosed during an exam for other colon problems. The condition may be diagnosed by:  · Using a flexible scope to examine the colon (colonoscopy).  · Taking an X-ray of the colon after dye has been put into the colon (barium enema).  · Having a CT scan.  How is this treated?  You may not need treatment for this condition. Your health care provider may recommend treatment to prevent problems. You may need treatment if you have symptoms or if you previously had diverticulitis. Treatment may include:  · Eating a high-fiber  diet.  · Taking a fiber supplement.  · Taking a live bacteria supplement (probiotic).  · Taking medicine to relax your colon.  Follow these instructions at home:  Medicines  · Take over-the-counter and prescription medicines only as told by your health care provider.  · If told by your health care provider, take a fiber supplement or probiotic.  Constipation prevention  Your condition may cause constipation. To prevent or treat constipation, you may need to:  · Drink enough fluid to keep your urine pale yellow.  · Take over-the-counter or prescription medicines.  · Eat foods that are high in fiber, such as beans, whole grains, and fresh fruits and vegetables.  · Limit foods that are high in fat and processed sugars, such as fried or sweet foods.    General instructions  · Try not to strain when you have a bowel movement.  · Keep all follow-up visits as told by your health care provider. This is important.  Contact a health care provider if you:  · Have pain in your abdomen.  · Have bloating.  · Have cramps.  · Have not had a bowel movement in 3 days.  Get help right away if:  · Your pain gets worse.  · Your bloating becomes very bad.  · You have a fever or chills, and your symptoms suddenly get worse.  · You vomit.  · You have bowel movements that are bloody or black.  · You have bleeding from your rectum.  Summary  · Diverticulosis is a condition that develops when small pouches (diverticula) form in the wall of the large intestine (colon).  · You may have a few pouches or many of them.  · This condition is most often diagnosed during an exam for other colon problems.  · Treatment may include increasing the fiber in your diet, taking supplements, or taking medicines.  This information is not intended to replace advice given to you by your health care provider. Make sure you discuss any questions you have with your health care provider.  Document Revised: 07/16/2020 Document Reviewed: 07/16/2020  [employer]vier Patient  Education © 2021 [employer]  Colon Polyps    Colon polyps are tissue growths inside the colon, which is part of the large intestine. They are one of the types of polyps that can grow in the body. A polyp may be a round bump or a mushroom-shaped growth. You could have one polyp or more than one.  Most colon polyps are noncancerous (benign). However, some colon polyps can become cancerous over time. Finding and removing the polyps early can help prevent this.  What are the causes?  The exact cause of colon polyps is not known.  What increases the risk?  The following factors may make you more likely to develop this condition:  · Having a family history of colorectal cancer or colon polyps.  · Being older than 45 years of age.  · Being younger than 45 years of age and having a significant family history of colorectal cancer or colon polyps or a genetic condition that puts you at higher risk of getting colon polyps.  · Having inflammatory bowel disease, such as ulcerative colitis or Crohn's disease.  · Having certain conditions passed from parent to child (hereditary conditions), such as:  ? Familial adenomatous polyposis (FAP).  ? Deluna syndrome.  ? Turcot syndrome.  ? Peutz-Jeghers syndrome.  ? MUTYH-associated polyposis (MAP).  · Being overweight.  · Certain lifestyle factors. These include smoking cigarettes, drinking too much alcohol, not getting enough exercise, and eating a diet that is high in fat and red meat and low in fiber.  · Having had childhood cancer that was treated with radiation of the abdomen.  What are the signs or symptoms?  Many times, there are no symptoms.  If you have symptoms, they may include:  · Blood coming from the rectum during a bowel movement.  · Blood in the stool (feces). The blood may be bright red or very dark in color.  · Pain in the abdomen.  · A change in bowel habits, such as constipation or diarrhea.  How is this diagnosed?  This condition is diagnosed with a colonoscopy.  This is a procedure in which a lighted, flexible scope is inserted into the opening between the buttocks (anus) and then passed into the colon to examine the area. Polyps are sometimes found when a colonoscopy is done as part of routine cancer screening tests.  How is this treated?  This condition is treated by removing any polyps that are found. Most polyps can be removed during a colonoscopy. Those polyps will then be tested for cancer. Additional treatment may be needed depending on the results of testing.  Follow these instructions at home:  Eating and drinking    · Eat foods that are high in fiber, such as fruits, vegetables, and whole grains.  · Eat foods that are high in calcium and vitamin D, such as milk, cheese, yogurt, eggs, liver, fish, and broccoli.  · Limit foods that are high in fat, such as fried foods and desserts.  · Limit the amount of red meat, precooked or cured meat, or other processed meat that you eat, such as hot dogs, sausages, patel, or meat loaves.  · Limit sugary drinks.    Lifestyle  · Maintain a healthy weight, or lose weight if recommended by your health care provider.  · Exercise every day or as told by your health care provider.  · Do not use any products that contain nicotine or tobacco, such as cigarettes, e-cigarettes, and chewing tobacco. If you need help quitting, ask your health care provider.  · Do not drink alcohol if:  ? Your health care provider tells you not to drink.  ? You are pregnant, may be pregnant, or are planning to become pregnant.  · If you drink alcohol:  ? Limit how much you use to:  § 0-1 drink a day for women.  § 0-2 drinks a day for men.  ? Know how much alcohol is in your drink. In the [place] one drink equals one 12 oz bottle of beer (355 mL), one 5 oz glass of wine (148 mL), or one 1½ oz glass of hard liquor (44 mL).  General instructions  · Take over-the-counter and prescription medicines only as told by your health care provider.  · Keep all follow-up  visits. This is important. This includes having regularly scheduled colonoscopies. Talk to your health care provider about when you need a colonoscopy.  Contact a health care provider if:  · You have new or worsening bleeding during a bowel movement.  · You have new or increased blood in your stool.  · You have a change in bowel habits.  · You lose weight for no known reason.  Summary  · Colon polyps are tissue growths inside the colon, which is part of the large intestine. They are one type of polyp that can grow in the body.  · Most colon polyps are noncancerous (benign), but some can become cancerous over time.  · This condition is diagnosed with a colonoscopy.  · This condition is treated by removing any polyps that are found. Most polyps can be removed during a colonoscopy.  This information is not intended to replace advice given to you by your health care provider. Make sure you discuss any questions you have with your health care provider.  Document Revised: 04/07/2021 Document Reviewed: 04/07/2021  [employer] Patient Education © 2021 [employer]  Monitored Anesthesia Care  Anesthesia refers to techniques, procedures, and medicines that help a person stay safe and comfortable during a medical or dental procedure. Monitored anesthesia care, or sedation, is one type of anesthesia. Your anesthesia specialist may recommend sedation if you will be having a procedure that does not require you to be unconscious. You may have this procedure for:  · Cataract surgery.  · A dental procedure.  · A biopsy.  · A colonoscopy.  During the procedure, you may receive a medicine to help you relax (sedative). There are three levels of sedation:  · Mild sedation. At this level, you may feel awake and relaxed. You will be able to follow directions.  · Moderate sedation. At this level, you will be sleepy. You may not remember the procedure.  · Deep sedation. At this level, you will be asleep. You will not remember the  procedure.  The more medicine you are given, the deeper your level of sedation will be. Depending on how you respond to the procedure, the anesthesia specialist may change your level of sedation or the type of anesthesia to fit your needs. An anesthesia specialist will monitor you closely during the procedure.  Tell a health care provider about:  · Any allergies you have.  · All medicines you are taking, including vitamins, herbs, eye drops, creams, and over-the-counter medicines.  · Any problems you or family members have had with anesthetic medicines.  · Any blood disorders you have.  · Any surgeries you have had.  · Any medical conditions you have, such as sleep apnea.  · Whether you are pregnant or may be pregnant.  · Whether you use cigarettes, alcohol, or drugs.  · Any use of steroids, whether by mouth or as a cream.  What are the risks?  Generally, this is a safe procedure. However, problems may occur, including:  · Getting too much medicine (oversedation).  · Nausea.  · Allergic reaction to medicines.  · Trouble breathing. If this happens, a breathing tube may be used to help with breathing. It will be removed when you are awake and breathing on your own.  · Heart trouble.  · Lung trouble.  · Confusion that gets better with time (emergence delirium).  What happens before the procedure?  Staying hydrated  Follow instructions from your health care provider about hydration, which may include:  · Up to 2 hours before the procedure - you may continue to drink clear liquids, such as water, clear fruit juice, black coffee, and plain tea.  Eating and drinking restrictions  Follow instructions from your health care provider about eating and drinking, which may include:  · 8 hours before the procedure - stop eating heavy meals or foods, such as meat, fried foods, or fatty foods.  · 6 hours before the procedure - stop eating light meals or foods, such as toast or cereal.  · 6 hours before the procedure - stop drinking  milk or drinks that contain milk.  · 2 hours before the procedure - stop drinking clear liquids.  Medicines  Ask your health care provider about:  · Changing or stopping your regular medicines. This is especially important if you are taking diabetes medicines or blood thinners.  · Taking medicines such as aspirin and ibuprofen. These medicines can thin your blood. Do not take these medicines unless your health care provider tells you to take them.  · Taking over-the-counter medicines, vitamins, herbs, and supplements.  Tests and exams  · You will have a physical exam.  · You may have blood tests done to show:  ? How well your kidneys and liver are working.  ? How well your blood can clot.  General instructions  · Plan to have a responsible adult take you home from the hospital or clinic.  · If you will be going home right after the procedure, plan to have a responsible adult care for you for the time you are told. This is important.  What happens during the procedure?    · Your blood pressure, heart rate, breathing, level of pain, and overall condition will be monitored.  · An IV will be inserted into one of your veins.  · You will be given medicines as needed to keep you comfortable during the procedure. This may mean changing the level of sedation.  ? Depending on your age or the procedure, the sedative may be given:  § As a pill that you will swallow or as a pill that is inserted into the rectum.  § As an injection into the vein or muscle.  § As a spray through the nose.  · The procedure will be performed.  · Your breathing, heart rate, and blood pressure will be monitored during the procedure.  · When the procedure is over, the medicine will be stopped.  The procedure may vary among health care providers and hospitals.  What happens after the procedure?  · Your blood pressure, heart rate, breathing rate, and blood oxygen level will be monitored until you leave the hospital or clinic.  · You may feel sleepy,  clumsy, or nauseous.  · You may feel forgetful about what happened after the procedure.  · You may vomit.  · You may continue to get IV fluids.  · Do not drive or operate machinery until your health care provider says that it is safe.  Summary  · Monitored anesthesia care is used to keep a patient comfortable during short procedures.  · Tell your health care provider about any allergies or health conditions you have and about all the medicines you are taking.  · Before the procedure, follow instructions about when to stop eating and drinking and about changing or stopping any medicines.  · Your blood pressure, heart rate, breathing rate, and blood oxygen level will be monitored until you leave the hospital or clinic.  · Plan to have a responsible adult take you home from the hospital or clinic.  This information is not intended to replace advice given to you by your health care provider. Make sure you discuss any questions you have with your health care provider.  Document Revised: 04/23/2021 Document Reviewed: 11/19/2020  [employer] Patient Education © 2021 [employer]  Colonoscopy, Adult, Care After  This sheet gives you information about how to care for yourself after your procedure. Your health care provider may also give you more specific instructions. If you have problems or questions, contact your health care provider.  What can I expect after the procedure?  After the procedure, it is common to have:  · A small amount of blood in your stool for 24 hours after the procedure.  · Some gas.  · Mild cramping or bloating of your abdomen.  Follow these instructions at home:  Eating and drinking    · Drink enough fluid to keep your urine pale yellow.  · Follow instructions from your health care provider about eating or drinking restrictions.  · Resume your normal diet as instructed by your health care provider. Avoid heavy or fried foods that are hard to digest.    Activity  · Rest as told by your health care  provider.  · Avoid sitting for a long time without moving. Get up to take short walks every 1-2 hours. This is important to improve blood flow and breathing. Ask for help if you feel weak or unsteady.  · Return to your normal activities as told by your health care provider. Ask your health care provider what activities are safe for you.  Managing cramping and bloating    · Try walking around when you have cramps or feel bloated.  · Apply heat to your abdomen as told by your health care provider. Use the heat source that your health care provider recommends, such as a moist heat pack or a heating pad.  ? Place a towel between your skin and the heat source.  ? Leave the heat on for 20-30 minutes.  ? Remove the heat if your skin turns bright red. This is especially important if you are unable to feel pain, heat, or cold. You may have a greater risk of getting burned.    General instructions  · If you were given a sedative during the procedure, it can affect you for several hours. Do not drive or operate machinery until your health care provider says that it is safe.  · For the first 24 hours after the procedure:  ? Do not sign important documents.  ? Do not drink alcohol.  ? Do your regular daily activities at a slower pace than normal.  ? Eat soft foods that are easy to digest.  · Take over-the-counter and prescription medicines only as told by your health care provider.  · Keep all follow-up visits as told by your health care provider. This is important.  Contact a health care provider if:  · You have blood in your stool 2-3 days after the procedure.  Get help right away if you have:  · More than a small spotting of blood in your stool.  · Large blood clots in your stool.  · Swelling of your abdomen.  · Nausea or vomiting.  · A fever.  · Increasing pain in your abdomen that is not relieved with medicine.  Summary  · After the procedure, it is common to have a small amount of blood in your stool. You may also have  mild cramping and bloating of your abdomen.  · If you were given a sedative during the procedure, it can affect you for several hours. Do not drive or operate machinery until your health care provider says that it is safe.  · Get help right away if you have a lot of blood in your stool, nausea or vomiting, a fever, or increased pain in your abdomen.  This information is not intended to replace advice given to you by your health care provider. Make sure you discuss any questions you have with your health care provider.  Document Revised: 12/11/2020 Document Reviewed: 07/13/2020  [employer] Patient Education © 2021 [employer]

## 2021-11-08 LAB
CYTO UR: NORMAL
LAB AP CASE REPORT: NORMAL
LAB AP CLINICAL INFORMATION: NORMAL
PATH REPORT.FINAL DX SPEC: NORMAL
PATH REPORT.GROSS SPEC: NORMAL

## 2024-11-04 ENCOUNTER — HOSPITAL ENCOUNTER (EMERGENCY)
Facility: HOSPITAL | Age: 64
Discharge: HOME | End: 2024-11-04
Attending: EMERGENCY MEDICINE | Admitting: EMERGENCY MEDICINE
Payer: COMMERCIAL

## 2024-11-04 ENCOUNTER — APPOINTMENT (OUTPATIENT)
Dept: GENERAL RADIOLOGY | Facility: HOSPITAL | Age: 64
End: 2024-11-04
Payer: COMMERCIAL

## 2024-11-04 VITALS
SYSTOLIC BLOOD PRESSURE: 102 MMHG | OXYGEN SATURATION: 91 % | HEIGHT: 64 IN | WEIGHT: 246.69 LBS | DIASTOLIC BLOOD PRESSURE: 63 MMHG | TEMPERATURE: 98.2 F | HEART RATE: 69 BPM | RESPIRATION RATE: 17 BRPM | BODY MASS INDEX: 42.12 KG/M2

## 2024-11-04 DIAGNOSIS — M50.30 DEGENERATIVE DISC DISEASE, CERVICAL: Primary | ICD-10-CM

## 2024-11-04 DIAGNOSIS — M47.812 FACET ARTHROPATHY, CERVICAL: ICD-10-CM

## 2024-11-04 DIAGNOSIS — M54.2 NECK PAIN: ICD-10-CM

## 2024-11-04 LAB
ALBUMIN SERPL-MCNC: 4.4 G/DL (ref 3.5–5.2)
ALBUMIN/GLOB SERPL: 1.4 G/DL
ALP SERPL-CCNC: 65 U/L (ref 39–117)
ALT SERPL W P-5'-P-CCNC: 15 U/L (ref 1–33)
ANION GAP SERPL CALCULATED.3IONS-SCNC: 12.4 MMOL/L (ref 5–15)
AST SERPL-CCNC: 14 U/L (ref 1–32)
BASOPHILS # BLD AUTO: 0.03 10*3/MM3 (ref 0–0.2)
BASOPHILS NFR BLD AUTO: 0.4 % (ref 0–1.5)
BILIRUB SERPL-MCNC: 0.4 MG/DL (ref 0–1.2)
BUN SERPL-MCNC: 13 MG/DL (ref 8–23)
BUN/CREAT SERPL: 15.9 (ref 7–25)
CALCIUM SPEC-SCNC: 9.9 MG/DL (ref 8.6–10.5)
CHLORIDE SERPL-SCNC: 106 MMOL/L (ref 98–107)
CO2 SERPL-SCNC: 23.6 MMOL/L (ref 22–29)
CREAT SERPL-MCNC: 0.82 MG/DL (ref 0.57–1)
DEPRECATED RDW RBC AUTO: 46.9 FL (ref 37–54)
EGFRCR SERPLBLD CKD-EPI 2021: 79.5 ML/MIN/1.73
EOSINOPHIL # BLD AUTO: 0.24 10*3/MM3 (ref 0–0.4)
EOSINOPHIL NFR BLD AUTO: 3 % (ref 0.3–6.2)
ERYTHROCYTE [DISTWIDTH] IN BLOOD BY AUTOMATED COUNT: 14.5 % (ref 12.3–15.4)
GLOBULIN UR ELPH-MCNC: 3.2 GM/DL
GLUCOSE SERPL-MCNC: 96 MG/DL (ref 65–99)
HCT VFR BLD AUTO: 49.8 % (ref 34–46.6)
HGB BLD-MCNC: 15.8 G/DL (ref 12–15.9)
HOLD SPECIMEN: NORMAL
HOLD SPECIMEN: NORMAL
IMM GRANULOCYTES # BLD AUTO: 0.02 10*3/MM3 (ref 0–0.05)
IMM GRANULOCYTES NFR BLD AUTO: 0.2 % (ref 0–0.5)
LYMPHOCYTES # BLD AUTO: 2.59 10*3/MM3 (ref 0.7–3.1)
LYMPHOCYTES NFR BLD AUTO: 32.1 % (ref 19.6–45.3)
MCH RBC QN AUTO: 28.2 PG (ref 26.6–33)
MCHC RBC AUTO-ENTMCNC: 31.7 G/DL (ref 31.5–35.7)
MCV RBC AUTO: 88.9 FL (ref 79–97)
MONOCYTES # BLD AUTO: 0.54 10*3/MM3 (ref 0.1–0.9)
MONOCYTES NFR BLD AUTO: 6.7 % (ref 5–12)
NEUTROPHILS NFR BLD AUTO: 4.64 10*3/MM3 (ref 1.7–7)
NEUTROPHILS NFR BLD AUTO: 57.6 % (ref 42.7–76)
NRBC BLD AUTO-RTO: 0 /100 WBC (ref 0–0.2)
NT-PROBNP SERPL-MCNC: 69.8 PG/ML (ref 0–900)
PLATELET # BLD AUTO: 215 10*3/MM3 (ref 140–450)
PMV BLD AUTO: 11.4 FL (ref 6–12)
POTASSIUM SERPL-SCNC: 4.3 MMOL/L (ref 3.5–5.2)
PROT SERPL-MCNC: 7.6 G/DL (ref 6–8.5)
QT INTERVAL: 444 MS
QTC INTERVAL: 478 MS
RBC # BLD AUTO: 5.6 10*6/MM3 (ref 3.77–5.28)
SODIUM SERPL-SCNC: 142 MMOL/L (ref 136–145)
TROPONIN T SERPL HS-MCNC: <6 NG/L
WBC NRBC COR # BLD AUTO: 8.06 10*3/MM3 (ref 3.4–10.8)
WHOLE BLOOD HOLD COAG: NORMAL
WHOLE BLOOD HOLD SPECIMEN: NORMAL

## 2024-11-04 PROCEDURE — 84484 ASSAY OF TROPONIN QUANT: CPT | Performed by: EMERGENCY MEDICINE

## 2024-11-04 PROCEDURE — 83880 ASSAY OF NATRIURETIC PEPTIDE: CPT | Performed by: EMERGENCY MEDICINE

## 2024-11-04 PROCEDURE — 80053 COMPREHEN METABOLIC PANEL: CPT | Performed by: EMERGENCY MEDICINE

## 2024-11-04 PROCEDURE — 96375 TX/PRO/DX INJ NEW DRUG ADDON: CPT

## 2024-11-04 PROCEDURE — 25010000002 ORPHENADRINE CITRATE PER 60 MG

## 2024-11-04 PROCEDURE — 93005 ELECTROCARDIOGRAM TRACING: CPT | Performed by: EMERGENCY MEDICINE

## 2024-11-04 PROCEDURE — 72040 X-RAY EXAM NECK SPINE 2-3 VW: CPT

## 2024-11-04 PROCEDURE — 99284 EMERGENCY DEPT VISIT MOD MDM: CPT

## 2024-11-04 PROCEDURE — 85025 COMPLETE CBC W/AUTO DIFF WBC: CPT | Performed by: EMERGENCY MEDICINE

## 2024-11-04 PROCEDURE — 96374 THER/PROPH/DIAG INJ IV PUSH: CPT

## 2024-11-04 PROCEDURE — 36415 COLL VENOUS BLD VENIPUNCTURE: CPT | Performed by: EMERGENCY MEDICINE

## 2024-11-04 PROCEDURE — 71045 X-RAY EXAM CHEST 1 VIEW: CPT

## 2024-11-04 PROCEDURE — 25010000002 DROPERIDOL PER 5 MG

## 2024-11-04 RX ORDER — ORPHENADRINE CITRATE 30 MG/ML
60 INJECTION INTRAMUSCULAR; INTRAVENOUS ONCE
Status: COMPLETED | OUTPATIENT
Start: 2024-11-04 | End: 2024-11-04

## 2024-11-04 RX ORDER — CYCLOBENZAPRINE HCL 5 MG
5 TABLET ORAL 3 TIMES DAILY PRN
Qty: 15 TABLET | Refills: 0 | Status: SHIPPED | OUTPATIENT
Start: 2024-11-04

## 2024-11-04 RX ORDER — DROPERIDOL 2.5 MG/ML
2.5 INJECTION, SOLUTION INTRAMUSCULAR; INTRAVENOUS ONCE
Status: COMPLETED | OUTPATIENT
Start: 2024-11-04 | End: 2024-11-04

## 2024-11-04 RX ORDER — SODIUM CHLORIDE 0.9 % (FLUSH) 0.9 %
10 SYRINGE (ML) INJECTION AS NEEDED
Status: DISCONTINUED | OUTPATIENT
Start: 2024-11-04 | End: 2024-11-05 | Stop reason: HOSPADM

## 2024-11-04 RX ORDER — LISINOPRIL 40 MG/1
TABLET ORAL
COMMUNITY

## 2024-11-04 RX ORDER — SPIRONOLACTONE 25 MG/1
12.5 TABLET ORAL DAILY
COMMUNITY

## 2024-11-04 RX ADMIN — ORPHENADRINE CITRATE 60 MG: 60 INJECTION INTRAMUSCULAR; INTRAVENOUS at 23:38

## 2024-11-04 RX ADMIN — DROPERIDOL 2.5 MG: 2.5 INJECTION, SOLUTION INTRAMUSCULAR; INTRAVENOUS at 21:57

## 2024-11-05 NOTE — DISCHARGE INSTRUCTIONS
Imaging of your cervical spine shows that you have have moderate degenerative disc changes and facet arthropathy.  Continue taking Tylenol 1000 mg up to 2-3 times a day for neck pain.  You are also being discharged with Flexeril. Take your flexeril at night first since this may cause drowsiness.    I have sent a referral to neurosurgery.  They will reach out in 1-2 business days for an appointment.    Follow-up with your PCP in 3 to 5 days especially if symptoms worsen.    Return to the Emergency Department if you develop any uncontrollable fever, intractable pain, nausea, vomiting.

## 2024-11-05 NOTE — ED PROVIDER NOTES
Time: 7:30 PM EST  Date of encounter:  11/4/2024  Independent Historian/Clinical History and Information was obtained by:   Patient    History is limited by: N/A    Chief Complaint: Neck pain      History of Present Illness:  Patient is a 64 y.o. year old female who presents to the emergency department for evaluation of neck pain for the about 3 weeks. She feels like it is getting worse since she is now complaining of headache and radiating symptoms to her upper back. She also reports some chest achiness that started today. She does have a hx of heart surgery 5 years ago in [facility]. Denies fall or injury or history of headaches or migraines.  Denies nausea or vomiting. Patient takes two baby aspirins a day. Hx of diabetes, HLD, HTN, heart disease.      Patient Care Team  Primary Care Provider: Deejay Kaufman DO    Past Medical History:     Allergies   Allergen Reactions    Naproxen Hives     Past Medical History:   Diagnosis Date    Condition not found     hernia controlled with medication.    Condition not found     Spells. Patient has mult. complaints. Reports spells in the afternoon characterized as falling asleep. Currently undergoing at home sleepstudy. Pending results. Also reports spells that are characterized as having diff. comprehending conversations. I will pursue MRI of brain. Reports intermittent bouts of grossly elevated BP. Will pursue serum metanephrines to evaluate the unlikely poss pheochrom     Cystitis     Diabetes mellitus     Diverticulosis     GERD (gastroesophageal reflux disease)     Headache 09/07/2017    The patient reports a 2 to three year history of headaches.  She reports that her headaches worsened in August 2017. Since that time she has had intermittent bouts of elevated blood pressure.  She does report having a prior MRI of the brain few years ago.  However, her headaches have changed insevertiy in quality. ThusI will pursue an MRI of the brain.    Heart disease     left   blockage     High cholesterol     HTN (hypertension) 2017    well controlled    SRINI (obstructive sleep apnea) 2017    on CPAP    PONV (postoperative nausea and vomiting)      Past Surgical History:   Procedure Laterality Date    BLADDER SURGERY      repair    CHOLECYSTECTOMY      COLONOSCOPY  ,     COLONOSCOPY N/A 2021    Procedure: COLONOSCOPY;  Surgeon: Naeem Cisneros MD;  Location: [facility] ENDOSCOPY;  Service: Gastroenterology;  Laterality: N/A;  DIVERTICULOSIS/COLON POLYPS    ENDOSCOPY  2011    with dilation    TUBAL ABDOMINAL LIGATION      VENTRICULOSTOMY  2019    for idiopathic hypertrophic subaortic stenosis     Family History   Problem Relation Age of Onset    Cervical cancer Mother         mother living    Liver cancer Father          d/t CA    Lung cancer Other     Stomach cancer Daughter     Malig Hyperthermia Neg Hx        Home Medications:  Prior to Admission medications    Medication Sig Start Date End Date Taking? Authorizing Provider   amLODIPine (NORVASC) 5 MG tablet Take 5 mg by mouth Daily. 21   Mal Yap MD   aspirin (aspirin) 81 MG EC tablet Take 2 tablets by mouth Daily.    Mal Yap MD   coenzyme Q10 100 MG capsule Take 100 mg by mouth Daily.    Mal Yap MD   estradiol (ESTRACE) 0.1 MG/GM vaginal cream Insert 0.5 g into the vagina 3 (Three) Times a Week. 3/17/21   Mal Yap MD   fexofenadine (Allegra Allergy) 180 MG tablet Take 1 tablet by mouth Daily.    Mal Yap MD   fluticasone (FLONASE) 50 MCG/ACT nasal spray 1 spray into the nostril(s) as directed by provider 2 (two) times a day.    Mal Yap MD   metFORMIN (GLUCOPHAGE) 500 MG tablet Take 1 tablet by mouth 2 (Two) Times a Day With Meals.    Mal Yap MD   metoprolol succinate XL (TOPROL-XL) 100 MG 24 hr tablet Take 100 mg by mouth 2 (Two) Times a Day. 21   Mal Yap MD  "  multivitamin with minerals tablet tablet Take 1 tablet by mouth Daily.    Mal Yap MD   Omega-3 Fatty Acids (fish oil) 1000 MG capsule capsule Take 1 capsule by mouth Daily.    Mal Yap MD   pantoprazole (PROTONIX) 40 MG EC tablet Take 40 mg by mouth Daily. 6/15/21   Mal Yap MD   PEG-KCl-NaCl-NaSulf-Na Asc-C (MOVIPREP) 100 g reconstituted solution powder Instructions provided by office. Colon prep. 8/17/21   Nga Hawkins APRN   rosuvastatin (CRESTOR) 10 MG tablet Take 10 mg by mouth Daily. 8/12/21   Mal Yap MD   TURMERIC PO Take  by mouth.    Mal Yap MD   vitamin C (ASCORBIC ACID) 250 MG tablet Take 250 mg by mouth Daily.    Mal Yap MD   vitamin D (ERGOCALCIFEROL) 1.25 MG (21784 UT) capsule capsule Take 50,000 Units by mouth 1 (One) Time Per Week. 5/12/21   Mal Yap MD        Social History:   Social History     Tobacco Use    Smoking status: Never    Smokeless tobacco: Never   Vaping Use    Vaping status: Never Used   Substance Use Topics    Alcohol use: Yes     Comment: current some day occasionally    Drug use: Never         Review of Systems:  Review of Systems   Constitutional:  Negative for chills and fever.   HENT:  Negative for ear pain.    Eyes:  Negative for pain.   Respiratory:  Negative for cough and shortness of breath.    Cardiovascular:  Positive for chest pain.   Gastrointestinal:  Negative for abdominal pain, diarrhea, nausea and vomiting.   Genitourinary:  Negative for dysuria.   Musculoskeletal:  Positive for neck pain. Negative for arthralgias.   Skin:  Negative for rash.   Neurological:  Positive for headaches.        Physical Exam:  /63   Pulse 69   Temp 98.2 °F (36.8 °C)   Resp 17   Ht 162.6 cm (64\")   Wt 112 kg (246 lb 11.1 oz)   SpO2 91%   BMI 42.35 kg/m²     Physical Exam  Vitals and nursing note reviewed.   Constitutional:       Appearance: Normal appearance.   HENT:      Head: " Normocephalic and atraumatic.      Nose: Nose normal.      Mouth/Throat:      Mouth: Mucous membranes are moist.   Eyes:      Extraocular Movements: Extraocular movements intact.      Conjunctiva/sclera: Conjunctivae normal.      Pupils: Pupils are equal, round, and reactive to light.   Cardiovascular:      Rate and Rhythm: Normal rate and regular rhythm.      Pulses: Normal pulses.      Heart sounds: Normal heart sounds.   Pulmonary:      Effort: Pulmonary effort is normal.      Breath sounds: Normal breath sounds.   Abdominal:      General: Abdomen is flat. There is no distension.      Palpations: Abdomen is soft.   Musculoskeletal:      Cervical back: Normal range of motion and neck supple.      Comments: Cervical spine is tender to palpation. No specific focal tenderness. No occipital neuralgia. TTP to the arielle scapular muscles and upper trapezius.   Skin:     General: Skin is warm and dry.   Neurological:      General: No focal deficit present.      Mental Status: She is alert and oriented to person, place, and time.   Psychiatric:         Mood and Affect: Mood normal.         Behavior: Behavior normal.                  Procedures:  Procedures      Medical Decision Making:      Comorbidities that affect care:    Diabetes, hypertension, hyperlipidemia, heart disease    External Notes reviewed:    Previous Clinic Note: Reviewed urgent care note on 11/4/2024      The following orders were placed and all results were independently analyzed by me:  Orders Placed This Encounter   Procedures    XR Spine Cervical 2 or 3 View    XR Chest 1 View    [place] Draw    Comprehensive Metabolic Panel    BNP    Single High Sensitivity Troponin T    CBC Auto Differential    Ambulatory Referral to Neurosurgery    NPO Diet NPO Type: Strict NPO    Undress & Gown    Continuous Pulse Oximetry    Vital Signs    Oxygen Therapy- Nasal Cannula; Titrate 1-6 LPM Per SpO2; 90 - 95%    ECG 12 Lead ED Triage Standing Order; SOA    Insert  Peripheral IV    CBC & Differential    Green Top (Gel)    Lavender Top    Gold Top - SST    Light Blue Top       Medications Given in the Emergency Department:  Medications   sodium chloride 0.9 % flush 10 mL (has no administration in time range)   droperidol (INAPSINE) injection 2.5 mg (2.5 mg Intravenous Given 11/4/24 2157)   orphenadrine (NORFLEX) injection 60 mg (60 mg Intravenous Given 11/4/24 2338)        ED Course:    ED Course as of 11/05/24 0151   Mon Nov 04, 2024 1931 --- PROVIDER IN TRIAGE NOTE ---    The patient was evaluated by Zurdo viramontes in triage. Orders were placed and the patient is currently awaiting disposition.    [AJ]      ED Course User Index  [AJ] Zurdo Callahan PA-C       Labs:    Lab Results (last 24 hours)       Procedure Component Value Units Date/Time    CBC & Differential [[id number]]  (Abnormal) Collected: 11/04/24 1941    Specimen: Blood from Arm, Right Updated: 11/04/24 2020    Narrative:      The following orders were created for panel order CBC & Differential.  Procedure                               Abnormality         Status                     ---------                               -----------         ------                     CBC Auto Differential[[id number]]        Abnormal            Final result                 Please view results for these tests on the individual orders.    Comprehensive Metabolic Panel [[id number]] Collected: 11/04/24 1941    Specimen: Blood from Arm, Right Updated: 11/04/24 2045     Glucose 96 mg/dL      BUN 13 mg/dL      Creatinine 0.82 mg/dL      Sodium 142 mmol/L      Potassium 4.3 mmol/L      Chloride 106 mmol/L      CO2 23.6 mmol/L      Calcium 9.9 mg/dL      Total Protein 7.6 g/dL      Albumin 4.4 g/dL      ALT (SGPT) 15 U/L      AST (SGOT) 14 U/L      Alkaline Phosphatase 65 U/L      Total Bilirubin 0.4 mg/dL      Globulin 3.2 gm/dL      A/G Ratio 1.4 g/dL      BUN/Creatinine Ratio 15.9     Anion Gap 12.4 mmol/L      eGFR 79.5  mL/min/1.73     Narrative:      GFR Normal >60  Chronic Kidney Disease <60  Kidney Failure <15      BNP [[id number]]  (Normal) Collected: 11/04/24 1941    Specimen: Blood from Arm, Right Updated: 11/04/24 2037     proBNP 69.8 pg/mL     Narrative:      This assay is used as an aid in the diagnosis of individuals suspected of having heart failure. It can be used as an aid in the diagnosis of acute decompensated heart failure (ADHF) in patients presenting with signs and symptoms of ADHF to the emergency department (ED). In addition, NT-proBNP of <300 pg/mL indicates ADHF is not likely.    Age Range Result Interpretation  NT-proBNP Concentration (pg/mL:      <50             Positive            >450                   Gray                 300-450                    Negative             <300    50-75           Positive            >900                  Gray                300-900                  Negative            <300      >75             Positive            >1800                  Gray                300-1800                  Negative            <300    Single High Sensitivity Troponin T [[id number]]  (Normal) Collected: 11/04/24 1941    Specimen: Blood from Arm, Right Updated: 11/04/24 2043     HS Troponin T <6 ng/L     Narrative:      High Sensitive Troponin T Reference Range:  <14.0 ng/L- Negative Female for AMI  <22.0 ng/L- Negative Male for AMI  >=14 - Abnormal Female indicating possible myocardial injury.  >=22 - Abnormal Male indicating possible myocardial injury.   Clinicians would have to utilize clinical acumen, EKG, Troponin, and serial changes to determine if it is an Acute Myocardial Infarction or myocardial injury due to an underlying chronic condition.         CBC Auto Differential [[id number]]  (Abnormal) Collected: 11/04/24 1941    Specimen: Blood from Arm, Right Updated: 11/04/24 2020     WBC 8.06 10*3/mm3      RBC 5.60 10*6/mm3      Hemoglobin 15.8 g/dL      Hematocrit 49.8 %      MCV 88.9 fL      MCH  28.2 pg      MCHC 31.7 g/dL      RDW 14.5 %      RDW-SD 46.9 fl      MPV 11.4 fL      Platelets 215 10*3/mm3      Neutrophil % 57.6 %      Lymphocyte % 32.1 %      Monocyte % 6.7 %      Eosinophil % 3.0 %      Basophil % 0.4 %      Immature Grans % 0.2 %      Neutrophils, Absolute 4.64 10*3/mm3      Lymphocytes, Absolute 2.59 10*3/mm3      Monocytes, Absolute 0.54 10*3/mm3      Eosinophils, Absolute 0.24 10*3/mm3      Basophils, Absolute 0.03 10*3/mm3      Immature Grans, Absolute 0.02 10*3/mm3      nRBC 0.0 /100 WBC              Imaging:    XR Chest 1 View    Result Date: 11/4/2024  XR CHEST 1 VW Date of Exam: 11/4/2024 8:06 PM EST Indication: SOA Triage Protocol Comparison: CXR 3/21/2019 Findings: The heart is normal in size. The lungs are well-expanded and free of infiltrates. Changes of median sternotomy are evident. Bony structures appear intact.     Impression: No active disease is seen. Post median sternotomy. Electronically Signed: Aleksander Holman MD  11/4/2024 8:29 PM EST  Workstation ID: [id number]    XR Spine Cervical 2 or 3 View    Result Date: 11/4/2024  XR SPINE CERVICAL 2 OR 3 VW Date of Exam: 11/4/2024 8:05 PM EST Indication: pain Comparison: None available. Findings: No abnormality is seen at the craniocervical junction. Moderate degenerative spurring is seen in the articulation between the anterior arch of C1 and the dens. Vertebral body heights are well-maintained. No fractures or subluxations are seen. Moderate facet arthropathy is seen throughout the cervical spine. The C2-C3 disc space is maintained. Mild endplate spurring is evident. Moderate narrowing of the C3-C4, C4-C5, C5-C6, C6-C7 and C7-T1 disc spaces is seen. Bulky anterior spurring is seen from C3-C6. No abnormality is seen on the odontoid view. No soft tissue abnormality is seen.     Impression: Cervical spine series demonstrating multilevel degenerative change. Electronically Signed: Aleksander Holman MD  11/4/2024 8:28 PM EST  Workstation  ID: [id number]       Differential Diagnosis and Discussion:    Neck Pain: The patient presents with neck pain. My differential diagnosis includes but is not limited to acute spinal epidural abscess, acute spinal epidural bleed, meningitis, musculoskeletal neck pain, spinal fracture, and osteoarthritis.     All labs were reviewed and interpreted by me.  All X-rays impressions were independently interpreted by me.  EKG was interpreted by supervising attending.  CT scan radiology impression was interpreted by me.    MDM     Amount and/or Complexity of Data Reviewed  Clinical lab tests: reviewed  Tests in the radiology section of CPT®: reviewed    Risk of Complications, Morbidity, and/or Mortality  Presenting problems: moderate  Diagnostic procedures: moderate  Management options: low    Patient Progress  Patient progress: stable    Patient presents to the emergency department for evaluation of neck pain for the about 3 weeks. She feels like it is getting worse since she is now complaining of headache and radiating symptoms to her upper back. She also reports some chest achiness that started today. She does have a hx of heart surgery 5 years ago in [facility]. Denies fall or injury or history of headaches or migraines.  Denies nausea or vomiting. Patient takes two baby aspirins a day. Hx of diabetes, HLD, HTN, heart disease.    On exam  Cervical spine is tender to palpation. No specific focal tenderness. No occipital neuralgia. TTP to the arielle scapular muscles and upper trapezius.    The patient´s CBC that was reviewed and interpreted by me shows no abnormalities of critical concern. Of note, there is no anemia requiring a blood transfusion and the platelet count is acceptable.  The patient´s CMP that was reviewed and interpretted by me shows no abnormalities of critical concern. Of note, the patient´s sodium and potassium are acceptable. The patient´s liver enzymes are unremarkable. The patient´s renal function  (creatinine) is preserved. The patient has a normal anion gap.    Troponin and BNP are within normal limits.    XR Chest 1 View   Final Result   Impression:   No active disease is seen.      Post median sternotomy.         Electronically Signed: Aleksander Holman MD     11/4/2024 8:29 PM EST     Workstation ID: [id number]      XR Spine Cervical 2 or 3 View   Final Result   Impression:   Cervical spine series demonstrating multilevel degenerative change.               Electronically Signed: Aleksander Holman MD     11/4/2024 8:28 PM EST     Workstation ID: [id number]        Discussed imaging findings with the patient.  Will send referral to neurosurgery for the patient's worsening degenerative changes through her cervical spine.  Considered giving the patient steroids and NSAIDs however, patient has a history of diabetes.  Patient is also allergic to naproxen.  Patient will continue to take Tylenol 1000 mg up to 3 times a day for pain.  Will discharge the patient with Flexeril.    Follow-up with her PCP in 3 to 5 days especially if symptoms worsen.            Patient Care Considerations:    ANTIBIOTICS: I considered prescribing antibiotics as an outpatient however no bacterial focus of infection was found.      Consultants/Shared Management Plan:    None    Social Determinants of Health:    Patient is independent, reliable, and has access to care.       Disposition and Care Coordination:    Discharged: The patient is suitable and stable for discharge with no need for consideration of admission.    I have explained the patient´s condition, diagnoses and treatment plan based on the information available to me at this time. I have answered questions and addressed any concerns. The patient has a good  understanding of the patient´s diagnosis, condition, and treatment plan as can be expected at this point. The vital signs have been stable. The patient´s condition is stable and appropriate for discharge from the emergency  department.      The patient will pursue further outpatient evaluation with the primary care physician or other designated or consulting physician as outlined in the discharge instructions. They are agreeable to this plan of care and follow-up instructions have been explained in detail. The patient has received these instructions in written format and has expressed an understanding of the discharge instructions. The patient is aware that any significant change in condition or worsening of symptoms should prompt an immediate return to this or the closest emergency department or call to 911.  I have explained discharge medications and the need for follow up with the patient/caretakers. This was also printed in the discharge instructions. Patient was discharged with the following medications and follow up:      Medication List        New Prescriptions      cyclobenzaprine 5 MG tablet  Commonly known as: FLEXERIL  Take 1 tablet by mouth 3 (Three) Times a Day As Needed (pain).               Where to Get Your Medications        These medications were sent to [facility] - [place], [place] - [address] [place] [address] [phone]  - [phone]   [address] [facility] [address]      Phone: [phone]   cyclobenzaprine 5 MG tablet      No follow-up provider specified.     Final diagnoses:   Degenerative disc disease, cervical   Neck pain   Facet arthropathy, cervical        ED Disposition       ED Disposition   Discharge    Condition   Stable    Comment   --               This medical record created using voice recognition software.             Jean-Paul Reese PA  11/05/24 0151

## 2024-11-13 ENCOUNTER — OFFICE VISIT (OUTPATIENT)
Dept: NEUROSURGERY | Facility: CLINIC | Age: 64
End: 2024-11-13
Payer: COMMERCIAL

## 2024-11-13 VITALS — HEIGHT: 64 IN | WEIGHT: 244.8 LBS | BODY MASS INDEX: 41.79 KG/M2

## 2024-11-13 DIAGNOSIS — M54.2 CERVICALGIA: Primary | ICD-10-CM

## 2024-11-13 DIAGNOSIS — M47.812 CERVICAL SPONDYLOSIS WITHOUT MYELOPATHY: ICD-10-CM

## 2024-11-13 RX ORDER — DULAGLUTIDE 1.5 MG/.5ML
INJECTION, SOLUTION SUBCUTANEOUS
COMMUNITY

## 2024-11-13 RX ORDER — MAGNESIUM OXIDE 400 MG/1
400 TABLET ORAL 2 TIMES DAILY
COMMUNITY

## 2024-11-13 NOTE — PROGRESS NOTES
"Chief Complaint  Neck Pain    Subjective            Yani Rowe who is a 64 y.o. year old female who presents to [facility] for Evaluation of the Spine.       History of Present Illness  The patient presents for evaluation of neck pain.    He has been experiencing neck pain for approximately 3.5 weeks, which he describes as sudden in onset. The pain is localized to the neck and does not radiate to the arms. It is constant and tends to worsen in the afternoon, particularly when he is at work and looking down at his counter. He rates the pain as a 4 to 5 on a scale of 10. He has been managing the pain with Tylenol, which provides some relief. He reports that the pain radiates down to his middle back and up to the top of his head. Initially, he was unable to turn his head to the right side, but now he can move it, although the pain radiates into his head. He recalls having similar neck pain about 10 years ago, which was alleviated with physical therapy and needling in the back of his neck.    He denies any associated trauma or injury. He is not experiencing any numbness or tingling. He also reports mild dizziness and occasional vision issues, which he is unsure are related to his diabetes or the neck pain. He has noticed some difficulty swallowing.    He was prescribed Flexeril for the pain during a visit to the ER. He avoids anti-inflammatory medications due to a history of open-heart surgery. He underwent bypass surgery 5 years ago. He had x-rays taken at the ER.    This patient  reports that she has never smoked. She has never used smokeless tobacco.    Review of Systems   Musculoskeletal:  Positive for neck pain.   Neurological:  Positive for headache.        Objective   Vital Signs:   Ht 162.6 cm (64\")   Wt 111 kg (244 lb 12.8 oz)   BMI 42.02 kg/m²       Physical Exam  Constitutional:       Comments: BMI 42   Neck:      Comments: Mildly reduced ROM  Pulmonary:      " Effort: Pulmonary effort is normal.   Neurological:      Mental Status: She is alert.      Sensory: No sensory deficit.      Motor: No weakness.      Deep Tendon Reflexes: Reflexes normal (neg Hoffmans).   Psychiatric:         Mood and Affect: Mood normal.          Result Review :   I have personally interpreted the patient's x-rays with the patient.     Results  Imaging  X-ray of the neck shows significant anterior spurs from C3 to C6, suggesting diffuse idiopathic skeletal hyperostosis.          Assessment and Plan    Diagnoses and all orders for this visit:    1. Cervicalgia (Primary)    2. Cervical spondylosis without myelopathy        Assessment & Plan  1. Neck pain.  The patient's x-rays reveal significant spurring on the anterior aspect of the spine, indicative of arthritis. Notably, there are large anterior spurs from C3 to C6, suggestive of diffuse idiopathic skeletal hyperostosis. A prominent spur is located posterior to the esophagus, which could be contributing to the neck pain. His range of motion is mildly reduced. A course of physical therapy is recommended to improve his range of motion. He is also advised to perform stretching and strengthening exercises at home. Should there be no improvement towards the end of the therapy, an MRI will be considered.           Follow Up   No follow-ups on file.  Patient was given instructions and counseling regarding her condition or for health maintenance advice. Please see specific information pulled into the AVS if appropriate.     Patient or patient representative verbalized consent for the use of Ambient Listening during the visit with  Stefan Shannon MD for chart documentation. 11/13/2024  15:12 EST

## (undated) DEVICE — COLON KIT: Brand: MEDLINE INDUSTRIES, INC.

## (undated) DEVICE — SOL IRRG H2O PL/BG 1000ML STRL

## (undated) DEVICE — Device: Brand: DEFENDO AIR/WATER/SUCTION AND BIOPSY VALVE